# Patient Record
Sex: FEMALE | Race: WHITE | Employment: FULL TIME | URBAN - METROPOLITAN AREA
[De-identification: names, ages, dates, MRNs, and addresses within clinical notes are randomized per-mention and may not be internally consistent; named-entity substitution may affect disease eponyms.]

---

## 2017-03-21 ENCOUNTER — HOSPITAL ENCOUNTER (OUTPATIENT)
Age: 52
Discharge: HOME | End: 2017-03-21
Payer: COMMERCIAL

## 2017-03-21 LAB — HIV AG/AB: NONREACTIVE

## 2017-03-21 PROCEDURE — 86696 HERPES SIMPLEX TYPE 2 TEST: CPT

## 2017-03-21 PROCEDURE — 36415 COLL VENOUS BLD VENIPUNCTURE: CPT

## 2017-03-21 PROCEDURE — 87389 HIV-1 AG W/HIV-1&-2 AB AG IA: CPT

## 2017-03-23 LAB — HERPES SIMPLEX VIRUS 2 IGG: 5.31

## 2017-03-28 ENCOUNTER — HOSPITAL ENCOUNTER (OUTPATIENT)
Dept: WOMENS IMAGING | Age: 52
Discharge: HOME | End: 2017-03-28
Payer: COMMERCIAL

## 2017-03-28 DIAGNOSIS — Z12.39 BREAST CANCER SCREENING: ICD-10-CM

## 2017-03-28 PROCEDURE — 77063 BREAST TOMOSYNTHESIS BI: CPT

## 2017-07-03 ENCOUNTER — ANESTHESIA (OUTPATIENT)
Dept: OPERATING ROOM | Age: 52
End: 2017-07-03
Payer: COMMERCIAL

## 2017-07-03 ENCOUNTER — ANESTHESIA EVENT (OUTPATIENT)
Dept: OPERATING ROOM | Age: 52
End: 2017-07-03
Payer: COMMERCIAL

## 2017-07-03 ENCOUNTER — HOSPITAL ENCOUNTER (OUTPATIENT)
Age: 52
Setting detail: OUTPATIENT SURGERY
Discharge: HOME | End: 2017-07-03
Attending: SURGERY | Admitting: SURGERY
Payer: COMMERCIAL

## 2017-07-03 VITALS — OXYGEN SATURATION: 96 % | DIASTOLIC BLOOD PRESSURE: 44 MMHG | SYSTOLIC BLOOD PRESSURE: 95 MMHG

## 2017-07-03 VITALS
HEIGHT: 62 IN | TEMPERATURE: 98.3 F | DIASTOLIC BLOOD PRESSURE: 63 MMHG | OXYGEN SATURATION: 98 % | RESPIRATION RATE: 16 BRPM | HEART RATE: 62 BPM | WEIGHT: 200 LBS | SYSTOLIC BLOOD PRESSURE: 102 MMHG | BODY MASS INDEX: 36.8 KG/M2

## 2017-07-03 LAB
-: NORMAL
HCG, PREGNANCY URINE (POC): NEGATIVE

## 2017-07-03 PROCEDURE — 84703 CHORIONIC GONADOTROPIN ASSAY: CPT

## 2017-07-03 PROCEDURE — 2500000003 HC RX 250 WO HCPCS: Performed by: NURSE ANESTHETIST, CERTIFIED REGISTERED

## 2017-07-03 PROCEDURE — 7100000010 HC PHASE II RECOVERY - FIRST 15 MIN: Performed by: SURGERY

## 2017-07-03 PROCEDURE — 7100000031 HC ASPR PHASE II RECOVERY - ADDTL 15 MIN: Performed by: SURGERY

## 2017-07-03 PROCEDURE — 7100000000 HC PACU RECOVERY - FIRST 15 MIN: Performed by: SURGERY

## 2017-07-03 PROCEDURE — 3700000001 HC ADD 15 MINUTES (ANESTHESIA): Performed by: SURGERY

## 2017-07-03 PROCEDURE — 7100000001 HC PACU RECOVERY - ADDTL 15 MIN: Performed by: SURGERY

## 2017-07-03 PROCEDURE — 3700000000 HC ANESTHESIA ATTENDED CARE: Performed by: SURGERY

## 2017-07-03 PROCEDURE — 7100000030 HC ASPR PHASE II RECOVERY - FIRST 15 MIN: Performed by: SURGERY

## 2017-07-03 PROCEDURE — 3609027000 HC COLONOSCOPY: Performed by: SURGERY

## 2017-07-03 PROCEDURE — 6360000002 HC RX W HCPCS: Performed by: NURSE ANESTHETIST, CERTIFIED REGISTERED

## 2017-07-03 PROCEDURE — 2580000003 HC RX 258: Performed by: ANESTHESIOLOGY

## 2017-07-03 PROCEDURE — 7100000011 HC PHASE II RECOVERY - ADDTL 15 MIN: Performed by: SURGERY

## 2017-07-03 RX ORDER — PROPOFOL 10 MG/ML
INJECTION, EMULSION INTRAVENOUS PRN
Status: DISCONTINUED | OUTPATIENT
Start: 2017-07-03 | End: 2017-07-03 | Stop reason: SDUPTHER

## 2017-07-03 RX ORDER — LIDOCAINE HYDROCHLORIDE 20 MG/ML
INJECTION, SOLUTION EPIDURAL; INFILTRATION; INTRACAUDAL; PERINEURAL PRN
Status: DISCONTINUED | OUTPATIENT
Start: 2017-07-03 | End: 2017-07-03 | Stop reason: SDUPTHER

## 2017-07-03 RX ORDER — DIPHENHYDRAMINE HYDROCHLORIDE 50 MG/ML
12.5 INJECTION INTRAMUSCULAR; INTRAVENOUS
Status: DISCONTINUED | OUTPATIENT
Start: 2017-07-03 | End: 2017-07-03 | Stop reason: HOSPADM

## 2017-07-03 RX ORDER — SODIUM CHLORIDE, SODIUM LACTATE, POTASSIUM CHLORIDE, CALCIUM CHLORIDE 600; 310; 30; 20 MG/100ML; MG/100ML; MG/100ML; MG/100ML
INJECTION, SOLUTION INTRAVENOUS CONTINUOUS
Status: DISCONTINUED | OUTPATIENT
Start: 2017-07-03 | End: 2017-07-03 | Stop reason: HOSPADM

## 2017-07-03 RX ORDER — MIDAZOLAM HYDROCHLORIDE 1 MG/ML
INJECTION INTRAMUSCULAR; INTRAVENOUS PRN
Status: DISCONTINUED | OUTPATIENT
Start: 2017-07-03 | End: 2017-07-03 | Stop reason: SDUPTHER

## 2017-07-03 RX ORDER — ONDANSETRON 2 MG/ML
4 INJECTION INTRAMUSCULAR; INTRAVENOUS
Status: DISCONTINUED | OUTPATIENT
Start: 2017-07-03 | End: 2017-07-03 | Stop reason: HOSPADM

## 2017-07-03 RX ORDER — FENTANYL CITRATE 50 UG/ML
INJECTION, SOLUTION INTRAMUSCULAR; INTRAVENOUS PRN
Status: DISCONTINUED | OUTPATIENT
Start: 2017-07-03 | End: 2017-07-03 | Stop reason: SDUPTHER

## 2017-07-03 RX ADMIN — FENTANYL CITRATE 25 MCG: 50 INJECTION INTRAMUSCULAR; INTRAVENOUS at 08:04

## 2017-07-03 RX ADMIN — FENTANYL CITRATE 25 MCG: 50 INJECTION INTRAMUSCULAR; INTRAVENOUS at 08:06

## 2017-07-03 RX ADMIN — FENTANYL CITRATE 25 MCG: 50 INJECTION INTRAMUSCULAR; INTRAVENOUS at 08:14

## 2017-07-03 RX ADMIN — SODIUM CHLORIDE, POTASSIUM CHLORIDE, SODIUM LACTATE AND CALCIUM CHLORIDE: 600; 310; 30; 20 INJECTION, SOLUTION INTRAVENOUS at 07:59

## 2017-07-03 RX ADMIN — PROPOFOL 20 MG: 10 INJECTION, EMULSION INTRAVENOUS at 08:15

## 2017-07-03 RX ADMIN — PROPOFOL 20 MG: 10 INJECTION, EMULSION INTRAVENOUS at 08:25

## 2017-07-03 RX ADMIN — PROPOFOL 20 MG: 10 INJECTION, EMULSION INTRAVENOUS at 08:21

## 2017-07-03 RX ADMIN — PROPOFOL 20 MG: 10 INJECTION, EMULSION INTRAVENOUS at 08:04

## 2017-07-03 RX ADMIN — SODIUM CHLORIDE, POTASSIUM CHLORIDE, SODIUM LACTATE AND CALCIUM CHLORIDE: 600; 310; 30; 20 INJECTION, SOLUTION INTRAVENOUS at 07:11

## 2017-07-03 RX ADMIN — PROPOFOL 30 MG: 10 INJECTION, EMULSION INTRAVENOUS at 08:06

## 2017-07-03 RX ADMIN — PROPOFOL 20 MG: 10 INJECTION, EMULSION INTRAVENOUS at 08:09

## 2017-07-03 RX ADMIN — LIDOCAINE HYDROCHLORIDE 100 MG: 20 INJECTION, SOLUTION EPIDURAL; INFILTRATION; INTRACAUDAL; PERINEURAL at 08:04

## 2017-07-03 RX ADMIN — PROPOFOL 20 MG: 10 INJECTION, EMULSION INTRAVENOUS at 08:13

## 2017-07-03 RX ADMIN — PROPOFOL 20 MG: 10 INJECTION, EMULSION INTRAVENOUS at 08:17

## 2017-07-03 RX ADMIN — FENTANYL CITRATE 25 MCG: 50 INJECTION INTRAMUSCULAR; INTRAVENOUS at 08:11

## 2017-07-03 RX ADMIN — MIDAZOLAM 2 MG: 1 INJECTION INTRAMUSCULAR; INTRAVENOUS at 07:59

## 2017-07-03 ASSESSMENT — PAIN - FUNCTIONAL ASSESSMENT: PAIN_FUNCTIONAL_ASSESSMENT: 0-10

## 2017-07-03 ASSESSMENT — PAIN SCALES - GENERAL: PAINLEVEL_OUTOF10: 0

## 2017-12-11 ENCOUNTER — HOSPITAL ENCOUNTER (INPATIENT)
Age: 52
LOS: 2 days | Discharge: PSYCHIATRIC OR SUBSTANCE USE FACILITY | DRG: 881 | End: 2017-12-13
Attending: EMERGENCY MEDICINE | Admitting: INTERNAL MEDICINE
Payer: COMMERCIAL

## 2017-12-11 DIAGNOSIS — F32.A DEPRESSION, UNSPECIFIED DEPRESSION TYPE: ICD-10-CM

## 2017-12-11 DIAGNOSIS — R45.851 SUICIDAL IDEATION: ICD-10-CM

## 2017-12-11 DIAGNOSIS — F10.920 ACUTE ALCOHOLIC INTOXICATION WITHOUT COMPLICATION (HCC): Primary | ICD-10-CM

## 2017-12-11 PROBLEM — F10.10 ALCOHOL ABUSE: Status: ACTIVE | Noted: 2017-12-11

## 2017-12-11 LAB
ABSOLUTE EOS #: 0.1 K/UL (ref 0–0.4)
ABSOLUTE IMMATURE GRANULOCYTE: ABNORMAL K/UL (ref 0–0.3)
ABSOLUTE LYMPH #: 1.9 K/UL (ref 1–4.8)
ABSOLUTE MONO #: 0.5 K/UL (ref 0.1–1.3)
ANION GAP SERPL CALCULATED.3IONS-SCNC: 12 MMOL/L (ref 9–17)
BASOPHILS # BLD: 1 % (ref 0–2)
BASOPHILS ABSOLUTE: 0.1 K/UL (ref 0–0.2)
BUN BLDV-MCNC: 14 MG/DL (ref 6–20)
BUN/CREAT BLD: ABNORMAL (ref 9–20)
CALCIUM SERPL-MCNC: 8.3 MG/DL (ref 8.6–10.4)
CHLORIDE BLD-SCNC: 103 MMOL/L (ref 98–107)
CO2: 27 MMOL/L (ref 20–31)
CREAT SERPL-MCNC: 0.57 MG/DL (ref 0.5–0.9)
DIFFERENTIAL TYPE: ABNORMAL
EOSINOPHILS RELATIVE PERCENT: 2 % (ref 0–4)
ETHANOL PERCENT: 0.43 %
ETHANOL: 430 MG/DL
GFR AFRICAN AMERICAN: >60 ML/MIN
GFR NON-AFRICAN AMERICAN: >60 ML/MIN
GFR SERPL CREATININE-BSD FRML MDRD: ABNORMAL ML/MIN/{1.73_M2}
GFR SERPL CREATININE-BSD FRML MDRD: ABNORMAL ML/MIN/{1.73_M2}
GLUCOSE BLD-MCNC: 92 MG/DL (ref 70–99)
HCT VFR BLD CALC: 44.1 % (ref 36–46)
HEMOGLOBIN: 14.9 G/DL (ref 12–16)
IMMATURE GRANULOCYTES: ABNORMAL %
LYMPHOCYTES # BLD: 28 % (ref 24–44)
MAGNESIUM: 2 MG/DL (ref 1.6–2.6)
MCH RBC QN AUTO: 31.4 PG (ref 26–34)
MCHC RBC AUTO-ENTMCNC: 33.9 G/DL (ref 31–37)
MCV RBC AUTO: 92.7 FL (ref 80–100)
MONOCYTES # BLD: 8 % (ref 1–7)
PDW BLD-RTO: 16.7 % (ref 11.5–14.9)
PLATELET # BLD: 125 K/UL (ref 150–450)
PLATELET ESTIMATE: ABNORMAL
PMV BLD AUTO: 7.9 FL (ref 6–12)
POTASSIUM SERPL-SCNC: 3.9 MMOL/L (ref 3.7–5.3)
RBC # BLD: 4.75 M/UL (ref 4–5.2)
RBC # BLD: ABNORMAL 10*6/UL
SEG NEUTROPHILS: 61 % (ref 36–66)
SEGMENTED NEUTROPHILS ABSOLUTE COUNT: 4.2 K/UL (ref 1.3–9.1)
SODIUM BLD-SCNC: 142 MMOL/L (ref 135–144)
WBC # BLD: 6.8 K/UL (ref 3.5–11)
WBC # BLD: ABNORMAL 10*3/UL

## 2017-12-11 PROCEDURE — 2060000000 HC ICU INTERMEDIATE R&B

## 2017-12-11 PROCEDURE — 80048 BASIC METABOLIC PNL TOTAL CA: CPT

## 2017-12-11 PROCEDURE — 94760 N-INVAS EAR/PLS OXIMETRY 1: CPT

## 2017-12-11 PROCEDURE — 99285 EMERGENCY DEPT VISIT HI MDM: CPT

## 2017-12-11 PROCEDURE — 85025 COMPLETE CBC W/AUTO DIFF WBC: CPT

## 2017-12-11 PROCEDURE — 2580000003 HC RX 258: Performed by: STUDENT IN AN ORGANIZED HEALTH CARE EDUCATION/TRAINING PROGRAM

## 2017-12-11 PROCEDURE — G0480 DRUG TEST DEF 1-7 CLASSES: HCPCS

## 2017-12-11 PROCEDURE — 36415 COLL VENOUS BLD VENIPUNCTURE: CPT

## 2017-12-11 PROCEDURE — 6370000000 HC RX 637 (ALT 250 FOR IP): Performed by: STUDENT IN AN ORGANIZED HEALTH CARE EDUCATION/TRAINING PROGRAM

## 2017-12-11 PROCEDURE — 99223 1ST HOSP IP/OBS HIGH 75: CPT | Performed by: INTERNAL MEDICINE

## 2017-12-11 PROCEDURE — 2500000003 HC RX 250 WO HCPCS: Performed by: STUDENT IN AN ORGANIZED HEALTH CARE EDUCATION/TRAINING PROGRAM

## 2017-12-11 PROCEDURE — 6360000002 HC RX W HCPCS: Performed by: STUDENT IN AN ORGANIZED HEALTH CARE EDUCATION/TRAINING PROGRAM

## 2017-12-11 PROCEDURE — 83735 ASSAY OF MAGNESIUM: CPT

## 2017-12-11 RX ORDER — TRAZODONE HYDROCHLORIDE 100 MG/1
100 TABLET ORAL NIGHTLY PRN
Status: DISCONTINUED | OUTPATIENT
Start: 2017-12-11 | End: 2017-12-13 | Stop reason: HOSPADM

## 2017-12-11 RX ORDER — LORAZEPAM 1 MG/1
1 TABLET ORAL
Status: DISCONTINUED | OUTPATIENT
Start: 2017-12-11 | End: 2017-12-13 | Stop reason: HOSPADM

## 2017-12-11 RX ORDER — LORAZEPAM 2 MG/ML
1 INJECTION INTRAMUSCULAR
Status: DISCONTINUED | OUTPATIENT
Start: 2017-12-11 | End: 2017-12-13 | Stop reason: HOSPADM

## 2017-12-11 RX ORDER — LORAZEPAM 2 MG/ML
4 INJECTION INTRAMUSCULAR
Status: DISCONTINUED | OUTPATIENT
Start: 2017-12-11 | End: 2017-12-13 | Stop reason: HOSPADM

## 2017-12-11 RX ORDER — LORAZEPAM 2 MG/ML
2 INJECTION INTRAMUSCULAR
Status: DISCONTINUED | OUTPATIENT
Start: 2017-12-11 | End: 2017-12-13 | Stop reason: HOSPADM

## 2017-12-11 RX ORDER — SODIUM CHLORIDE 0.9 % (FLUSH) 0.9 %
10 SYRINGE (ML) INJECTION PRN
Status: DISCONTINUED | OUTPATIENT
Start: 2017-12-11 | End: 2017-12-13 | Stop reason: HOSPADM

## 2017-12-11 RX ORDER — LORAZEPAM 2 MG/ML
3 INJECTION INTRAMUSCULAR
Status: DISCONTINUED | OUTPATIENT
Start: 2017-12-11 | End: 2017-12-13 | Stop reason: HOSPADM

## 2017-12-11 RX ORDER — NICOTINE 21 MG/24HR
1 PATCH, TRANSDERMAL 24 HOURS TRANSDERMAL DAILY
Status: DISCONTINUED | OUTPATIENT
Start: 2017-12-11 | End: 2017-12-13 | Stop reason: HOSPADM

## 2017-12-11 RX ORDER — SODIUM CHLORIDE 0.9 % (FLUSH) 0.9 %
10 SYRINGE (ML) INJECTION EVERY 12 HOURS SCHEDULED
Status: DISCONTINUED | OUTPATIENT
Start: 2017-12-11 | End: 2017-12-13 | Stop reason: HOSPADM

## 2017-12-11 RX ORDER — LORAZEPAM 1 MG/1
4 TABLET ORAL
Status: DISCONTINUED | OUTPATIENT
Start: 2017-12-11 | End: 2017-12-13 | Stop reason: HOSPADM

## 2017-12-11 RX ORDER — LORAZEPAM 1 MG/1
2 TABLET ORAL
Status: DISCONTINUED | OUTPATIENT
Start: 2017-12-11 | End: 2017-12-13 | Stop reason: HOSPADM

## 2017-12-11 RX ORDER — VALACYCLOVIR HYDROCHLORIDE 500 MG/1
500 TABLET, FILM COATED ORAL 2 TIMES DAILY
Status: DISCONTINUED | OUTPATIENT
Start: 2017-12-11 | End: 2017-12-13 | Stop reason: HOSPADM

## 2017-12-11 RX ORDER — SODIUM CHLORIDE 9 MG/ML
INJECTION, SOLUTION INTRAVENOUS CONTINUOUS
Status: DISCONTINUED | OUTPATIENT
Start: 2017-12-11 | End: 2017-12-13 | Stop reason: HOSPADM

## 2017-12-11 RX ORDER — LORAZEPAM 1 MG/1
3 TABLET ORAL
Status: DISCONTINUED | OUTPATIENT
Start: 2017-12-11 | End: 2017-12-13 | Stop reason: HOSPADM

## 2017-12-11 RX ORDER — ONDANSETRON 2 MG/ML
4 INJECTION INTRAMUSCULAR; INTRAVENOUS EVERY 6 HOURS PRN
Status: DISCONTINUED | OUTPATIENT
Start: 2017-12-11 | End: 2017-12-13 | Stop reason: HOSPADM

## 2017-12-11 RX ORDER — BUPROPION HYDROCHLORIDE 150 MG/1
150 TABLET ORAL DAILY
Status: DISCONTINUED | OUTPATIENT
Start: 2017-12-11 | End: 2017-12-13 | Stop reason: HOSPADM

## 2017-12-11 RX ORDER — FAMOTIDINE 20 MG/1
20 TABLET, FILM COATED ORAL 2 TIMES DAILY
Status: DISCONTINUED | OUTPATIENT
Start: 2017-12-11 | End: 2017-12-13 | Stop reason: HOSPADM

## 2017-12-11 RX ADMIN — SODIUM CHLORIDE: 9 INJECTION, SOLUTION INTRAVENOUS at 16:13

## 2017-12-11 RX ADMIN — SERTRALINE HYDROCHLORIDE 50 MG: 50 TABLET ORAL at 16:26

## 2017-12-11 RX ADMIN — LORAZEPAM 2 MG: 1 TABLET ORAL at 18:49

## 2017-12-11 RX ADMIN — FOLIC ACID: 5 INJECTION, SOLUTION INTRAMUSCULAR; INTRAVENOUS; SUBCUTANEOUS at 16:13

## 2017-12-11 RX ADMIN — FAMOTIDINE 20 MG: 20 TABLET, FILM COATED ORAL at 16:26

## 2017-12-11 RX ADMIN — FAMOTIDINE 20 MG: 20 TABLET, FILM COATED ORAL at 21:58

## 2017-12-11 RX ADMIN — BUPROPION HYDROCHLORIDE 150 MG: 150 TABLET, FILM COATED, EXTENDED RELEASE ORAL at 18:44

## 2017-12-11 RX ADMIN — ENOXAPARIN SODIUM 40 MG: 40 INJECTION SUBCUTANEOUS at 16:26

## 2017-12-11 ASSESSMENT — ENCOUNTER SYMPTOMS
COUGH: 0
EYE DISCHARGE: 0
SORE THROAT: 0
DIARRHEA: 0
COLOR CHANGE: 0
BLURRED VISION: 0
RHINORRHEA: 0
VOMITING: 0
ABDOMINAL PAIN: 0
NAUSEA: 0
SHORTNESS OF BREATH: 0
EYE REDNESS: 0

## 2017-12-11 ASSESSMENT — PAIN SCALES - GENERAL: PAINLEVEL_OUTOF10: 0

## 2017-12-11 NOTE — ED NOTES
Pt presents to the [facility]. Pt appears intoxicated, pt unsteady on feet, crying and whining. Pt reports that she had a pint of vodka this morning. Charge  RN notified.

## 2017-12-11 NOTE — H&P
Provider, MD   BuPROPion HCl (WELLBUTRIN PO) Take 1 tablet by mouth daily    Historical Provider, MD   MELOXICAM PO Take 1 tablet by mouth daily    Historical Provider, MD        Allergies:     Review of patient's allergies indicates no known allergies. Social History:     Tobacco:    reports that she has quit smoking. She does not have any smokeless tobacco history on file. Alcohol:      reports that she does not drink alcohol. Drug Use:  reports that she does not use drugs. Family History:     No family history on file. Review of Systems:     Positive and Negative as described in HPI. Review of Systems   Constitutional: Negative for chills, fever and malaise/fatigue. Eyes: Negative for blurred vision. Respiratory: Negative for cough and shortness of breath. Cardiovascular: Negative for chest pain and palpitations. Gastrointestinal: Negative for abdominal pain, nausea and vomiting. Genitourinary: Negative for dysuria. Musculoskeletal: Negative for myalgias. Neurological: Negative for dizziness, weakness and headaches. Psychiatric/Behavioral: Positive for depression and suicidal ideas. Negative for hallucinations and substance abuse. The patient is nervous/anxious. Physical Exam:   /76   Pulse 77   Temp 98.6 °F (37 °C) (Oral)   Resp 12   Ht 5' 2\" (1.575 m)   Wt 190 lb (86.2 kg)   SpO2 93%   BMI 34.75 kg/m²   Temp (24hrs), Av.6 °F (37 °C), Min:98.6 °F (37 °C), Max:98.6 °F (37 °C)    No results for input(s): POCGLU in the last 72 hours. No intake or output data in the 24 hours ending 17 1341    Physical Exam   Constitutional: She is oriented to person, place, and time and well-developed, well-nourished, and in no distress. Cardiovascular: Normal rate and regular rhythm. Pulmonary/Chest: Effort normal and breath sounds normal.   Abdominal: Soft. Bowel sounds are normal.   Musculoskeletal: She exhibits no edema or tenderness.    Neurological: She is

## 2017-12-11 NOTE — PLAN OF CARE
Problem: Falls - Risk of  Goal: Absence of falls  Outcome: Ongoing  No falls noted this shift. Patient ambulates with x1 staff assistance without difficulty. Guard at the bedside. Problem: Suicide risk  Goal: Provide patient with safe environment  Provide patient with safe environment  Outcome: Ongoing  She admits to having thoughts of harming herself. No plan and no intention to harm anyone else. Guard remains at the bedside.

## 2017-12-11 NOTE — ED PROVIDER NOTES
[address] [facility]  eMERGENCY dEPARTMENT eNCOUnter      Pt Name: Lefty Vivas  MRN: [id number]  [place] 1965  Date of evaluation: 12/11/2017    CHIEF COMPLAINT       Chief Complaint   Patient presents with   [address] Problem    Depression         HISTORY OF PRESENT ILLNESS    Lefty Vivas is a 47 y.o. female who presents With complaints of alcohol intoxication and suicidal ideation. Patient states she is a daily drinker with a history of delirium tremens and alcohol withdrawal seizures. She states that she started around 9:00 this morning and drank about 1 pint of alcohol. She states that she is feeling suicidal and wants to kill himself. REVIEW OF SYSTEMS         Review of Systems   Constitutional: Negative for chills and fever. HENT: Negative for rhinorrhea and sore throat. Eyes: Negative for discharge, redness and visual disturbance. Respiratory: Negative for cough and shortness of breath. Cardiovascular: Negative for chest pain, palpitations and leg swelling. Gastrointestinal: Negative for diarrhea, nausea and vomiting. Genitourinary: Negative for dysuria and hematuria. Musculoskeletal: Negative for arthralgias, myalgias and neck pain. Skin: Negative for color change and rash. Neurological: Negative for seizures, weakness and headaches. Psychiatric/Behavioral: Positive for suicidal ideas. Negative for hallucinations and self-injury. Alcohol intoxication            PAST MEDICAL HISTORY    has a past medical history of Depression; Kidney disease; and Liver failure, acute. SURGICAL HISTORY      has a past surgical history that includes Hand surgery (Right); Dilation and curettage of uterus; Colonoscopy (07/03/2017); and colon ca scrn not hi rsk ind (N/A, 7/3/2017).      CURRENT MEDICATIONS       Previous Medications    BUPROPION HCL (WELLBUTRIN PO)    Take 1 tablet by mouth daily    MELOXICAM PO    Take 1 tablet by mouth daily SERTRALINE (ZOLOFT) 50 MG TABLET    Take 50 mg by mouth daily       ALLERGIES     has No Known Allergies. FAMILY HISTORY     has no family status information on file. family history is not on file. SOCIAL HISTORY      reports that she has quit smoking. She does not have any smokeless tobacco history on file. She reports that she does not drink alcohol or use drugs. PHYSICAL EXAM     INITIAL VITALS:  height is 5' 2\" (1.575 m) and weight is 190 lb (86.2 kg). Her oral temperature is 98.6 °F (37 °C). Her blood pressure is 145/91 (abnormal) and her pulse is 101. Her respiration is 18 and oxygen saturation is 95%. Physical Exam   Constitutional: She is oriented to person, place, and time. She appears well-developed and well-nourished. Non-toxic appearance. She does not appear ill. HENT:   Head: Normocephalic and atraumatic. Eyes: Conjunctivae and EOM are normal. Pupils are equal, round, and reactive to light. Neck: Trachea normal and normal range of motion. Neck supple. Cardiovascular: Normal rate, regular rhythm, S1 normal and S2 normal.    No murmur heard. Pulmonary/Chest: Effort normal and breath sounds normal. No accessory muscle usage. No respiratory distress. She exhibits no tenderness and no deformity. Abdominal: Normal appearance and bowel sounds are normal. She exhibits no distension, no abdominal bruit and no ascites. There is no tenderness. There is no rigidity, no rebound and no guarding. Neurological: She is oriented to person, place, and time. GCS eye subscore is 4. GCS verbal subscore is 5. GCS motor subscore is 6. Intoxicated   Skin: Skin is warm. No rash noted. Psychiatric: Her speech is normal. She exhibits a depressed mood. She expresses suicidal ideation. DIFFERENTIAL DIAGNOSIS/MDM:   60-year-old female presents with alcohol intoxication with suicidal ideation.   Plan is Blood alcohol level, monitor for signs of alcohol withdrawal.    DIAGNOSTIC RESULTS

## 2017-12-12 LAB
ABSOLUTE EOS #: 0.1 K/UL (ref 0–0.4)
ABSOLUTE IMMATURE GRANULOCYTE: ABNORMAL K/UL (ref 0–0.3)
ABSOLUTE LYMPH #: 1.7 K/UL (ref 1–4.8)
ABSOLUTE MONO #: 0.3 K/UL (ref 0.1–1.3)
ALBUMIN SERPL-MCNC: 3 G/DL (ref 3.5–5.2)
ALBUMIN/GLOBULIN RATIO: ABNORMAL (ref 1–2.5)
ALP BLD-CCNC: 66 U/L (ref 35–104)
ALT SERPL-CCNC: 21 U/L (ref 5–33)
ANION GAP SERPL CALCULATED.3IONS-SCNC: 12 MMOL/L (ref 9–17)
AST SERPL-CCNC: 53 U/L
BASOPHILS # BLD: 1 % (ref 0–2)
BASOPHILS ABSOLUTE: 0.1 K/UL (ref 0–0.2)
BILIRUB SERPL-MCNC: 0.24 MG/DL (ref 0.3–1.2)
BILIRUBIN DIRECT: 0.11 MG/DL
BILIRUBIN, INDIRECT: 0.13 MG/DL (ref 0–1)
BUN BLDV-MCNC: 13 MG/DL (ref 6–20)
BUN/CREAT BLD: ABNORMAL (ref 9–20)
CALCIUM SERPL-MCNC: 7 MG/DL (ref 8.6–10.4)
CHLORIDE BLD-SCNC: 104 MMOL/L (ref 98–107)
CO2: 23 MMOL/L (ref 20–31)
CREAT SERPL-MCNC: 0.54 MG/DL (ref 0.5–0.9)
DIFFERENTIAL TYPE: ABNORMAL
EOSINOPHILS RELATIVE PERCENT: 2 % (ref 0–4)
GFR AFRICAN AMERICAN: >60 ML/MIN
GFR NON-AFRICAN AMERICAN: >60 ML/MIN
GFR SERPL CREATININE-BSD FRML MDRD: ABNORMAL ML/MIN/{1.73_M2}
GFR SERPL CREATININE-BSD FRML MDRD: ABNORMAL ML/MIN/{1.73_M2}
GLOBULIN: ABNORMAL G/DL (ref 1.5–3.8)
GLUCOSE BLD-MCNC: 77 MG/DL (ref 70–99)
HCT VFR BLD CALC: 37.2 % (ref 36–46)
HEMOGLOBIN: 12.4 G/DL (ref 12–16)
IMMATURE GRANULOCYTES: ABNORMAL %
LYMPHOCYTES # BLD: 35 % (ref 24–44)
MCH RBC QN AUTO: 31.1 PG (ref 26–34)
MCHC RBC AUTO-ENTMCNC: 33.4 G/DL (ref 31–37)
MCV RBC AUTO: 93.1 FL (ref 80–100)
MONOCYTES # BLD: 6 % (ref 1–7)
PDW BLD-RTO: 16.9 % (ref 11.5–14.9)
PLATELET # BLD: 92 K/UL (ref 150–450)
PLATELET ESTIMATE: ABNORMAL
PMV BLD AUTO: 8 FL (ref 6–12)
POTASSIUM SERPL-SCNC: 3.9 MMOL/L (ref 3.7–5.3)
RBC # BLD: 3.99 M/UL (ref 4–5.2)
RBC # BLD: ABNORMAL 10*6/UL
SEG NEUTROPHILS: 56 % (ref 36–66)
SEGMENTED NEUTROPHILS ABSOLUTE COUNT: 2.8 K/UL (ref 1.3–9.1)
SODIUM BLD-SCNC: 139 MMOL/L (ref 135–144)
TOTAL PROTEIN: 5.6 G/DL (ref 6.4–8.3)
WBC # BLD: 5 K/UL (ref 3.5–11)
WBC # BLD: ABNORMAL 10*3/UL

## 2017-12-12 PROCEDURE — 97161 PT EVAL LOW COMPLEX 20 MIN: CPT

## 2017-12-12 PROCEDURE — G8979 MOBILITY GOAL STATUS: HCPCS

## 2017-12-12 PROCEDURE — 6370000000 HC RX 637 (ALT 250 FOR IP): Performed by: STUDENT IN AN ORGANIZED HEALTH CARE EDUCATION/TRAINING PROGRAM

## 2017-12-12 PROCEDURE — 80076 HEPATIC FUNCTION PANEL: CPT

## 2017-12-12 PROCEDURE — 36415 COLL VENOUS BLD VENIPUNCTURE: CPT

## 2017-12-12 PROCEDURE — 80048 BASIC METABOLIC PNL TOTAL CA: CPT

## 2017-12-12 PROCEDURE — 99232 SBSQ HOSP IP/OBS MODERATE 35: CPT | Performed by: INTERNAL MEDICINE

## 2017-12-12 PROCEDURE — 2580000003 HC RX 258: Performed by: STUDENT IN AN ORGANIZED HEALTH CARE EDUCATION/TRAINING PROGRAM

## 2017-12-12 PROCEDURE — 2060000000 HC ICU INTERMEDIATE R&B

## 2017-12-12 PROCEDURE — 85025 COMPLETE CBC W/AUTO DIFF WBC: CPT

## 2017-12-12 PROCEDURE — G8978 MOBILITY CURRENT STATUS: HCPCS

## 2017-12-12 PROCEDURE — 6360000002 HC RX W HCPCS: Performed by: STUDENT IN AN ORGANIZED HEALTH CARE EDUCATION/TRAINING PROGRAM

## 2017-12-12 PROCEDURE — 97165 OT EVAL LOW COMPLEX 30 MIN: CPT

## 2017-12-12 RX ORDER — LORAZEPAM 1 MG/1
2 TABLET ORAL
Status: CANCELLED | OUTPATIENT
Start: 2017-12-12

## 2017-12-12 RX ORDER — LORAZEPAM 1 MG/1
1 TABLET ORAL
Status: CANCELLED | OUTPATIENT
Start: 2017-12-12

## 2017-12-12 RX ORDER — LORAZEPAM 2 MG/ML
3 INJECTION INTRAMUSCULAR
Status: CANCELLED | OUTPATIENT
Start: 2017-12-12

## 2017-12-12 RX ORDER — LORAZEPAM 1 MG/1
4 TABLET ORAL
Status: CANCELLED | OUTPATIENT
Start: 2017-12-12

## 2017-12-12 RX ORDER — SODIUM CHLORIDE 0.9 % (FLUSH) 0.9 %
10 SYRINGE (ML) INJECTION PRN
Status: CANCELLED | OUTPATIENT
Start: 2017-12-12

## 2017-12-12 RX ORDER — SODIUM CHLORIDE 9 MG/ML
INJECTION, SOLUTION INTRAVENOUS CONTINUOUS
Status: CANCELLED | OUTPATIENT
Start: 2017-12-12

## 2017-12-12 RX ORDER — SODIUM CHLORIDE 0.9 % (FLUSH) 0.9 %
10 SYRINGE (ML) INJECTION EVERY 12 HOURS SCHEDULED
Status: CANCELLED | OUTPATIENT
Start: 2017-12-12

## 2017-12-12 RX ORDER — FAMOTIDINE 20 MG/1
20 TABLET, FILM COATED ORAL 2 TIMES DAILY
Status: CANCELLED | OUTPATIENT
Start: 2017-12-12

## 2017-12-12 RX ORDER — ONDANSETRON 2 MG/ML
4 INJECTION INTRAMUSCULAR; INTRAVENOUS EVERY 6 HOURS PRN
Status: CANCELLED | OUTPATIENT
Start: 2017-12-12

## 2017-12-12 RX ORDER — TRAZODONE HYDROCHLORIDE 100 MG/1
100 TABLET ORAL NIGHTLY PRN
Status: CANCELLED | OUTPATIENT
Start: 2017-12-12

## 2017-12-12 RX ORDER — CHLORDIAZEPOXIDE HYDROCHLORIDE 5 MG/1
5 CAPSULE, GELATIN COATED ORAL 4 TIMES DAILY
Status: CANCELLED | OUTPATIENT
Start: 2017-12-12

## 2017-12-12 RX ORDER — BUPROPION HYDROCHLORIDE 150 MG/1
150 TABLET ORAL DAILY
Status: CANCELLED | OUTPATIENT
Start: 2017-12-13

## 2017-12-12 RX ORDER — LORAZEPAM 2 MG/ML
2 INJECTION INTRAMUSCULAR
Status: CANCELLED | OUTPATIENT
Start: 2017-12-12

## 2017-12-12 RX ORDER — CHLORDIAZEPOXIDE HYDROCHLORIDE 25 MG/1
50 CAPSULE, GELATIN COATED ORAL 2 TIMES DAILY
Status: DISCONTINUED | OUTPATIENT
Start: 2017-12-12 | End: 2017-12-13 | Stop reason: HOSPADM

## 2017-12-12 RX ORDER — LORAZEPAM 1 MG/1
3 TABLET ORAL
Status: CANCELLED | OUTPATIENT
Start: 2017-12-12

## 2017-12-12 RX ORDER — LORAZEPAM 2 MG/ML
4 INJECTION INTRAMUSCULAR
Status: CANCELLED | OUTPATIENT
Start: 2017-12-12

## 2017-12-12 RX ORDER — ACYCLOVIR 200 MG/1
400 CAPSULE ORAL 3 TIMES DAILY
Status: CANCELLED | OUTPATIENT
Start: 2017-12-12

## 2017-12-12 RX ORDER — LORAZEPAM 2 MG/ML
1 INJECTION INTRAMUSCULAR
Status: CANCELLED | OUTPATIENT
Start: 2017-12-12

## 2017-12-12 RX ORDER — NICOTINE 21 MG/24HR
1 PATCH, TRANSDERMAL 24 HOURS TRANSDERMAL DAILY
Status: CANCELLED | OUTPATIENT
Start: 2017-12-13

## 2017-12-12 RX ADMIN — SERTRALINE HYDROCHLORIDE 50 MG: 50 TABLET ORAL at 09:55

## 2017-12-12 RX ADMIN — LORAZEPAM 3 MG: 1 TABLET ORAL at 10:03

## 2017-12-12 RX ADMIN — SODIUM CHLORIDE: 9 INJECTION, SOLUTION INTRAVENOUS at 05:31

## 2017-12-12 RX ADMIN — FAMOTIDINE 20 MG: 20 TABLET, FILM COATED ORAL at 09:55

## 2017-12-12 RX ADMIN — SODIUM CHLORIDE: 9 INJECTION, SOLUTION INTRAVENOUS at 17:51

## 2017-12-12 RX ADMIN — LORAZEPAM 4 MG: 2 INJECTION INTRAMUSCULAR; INTRAVENOUS at 14:04

## 2017-12-12 RX ADMIN — BUPROPION HYDROCHLORIDE 150 MG: 150 TABLET, FILM COATED, EXTENDED RELEASE ORAL at 09:55

## 2017-12-12 RX ADMIN — VALACYCLOVIR 500 MG: 500 TABLET, FILM COATED ORAL at 09:55

## 2017-12-12 RX ADMIN — LORAZEPAM 2 MG: 1 TABLET ORAL at 17:51

## 2017-12-12 RX ADMIN — Medication 10 ML: at 09:55

## 2017-12-12 RX ADMIN — ONDANSETRON 4 MG: 2 INJECTION INTRAMUSCULAR; INTRAVENOUS at 09:58

## 2017-12-12 RX ADMIN — CHLORDIAZEPOXIDE HYDROCHLORIDE 50 MG: 25 CAPSULE ORAL at 20:35

## 2017-12-12 RX ADMIN — FAMOTIDINE 20 MG: 20 TABLET, FILM COATED ORAL at 20:36

## 2017-12-12 RX ADMIN — ENOXAPARIN SODIUM 40 MG: 40 INJECTION SUBCUTANEOUS at 09:55

## 2017-12-12 RX ADMIN — VALACYCLOVIR 500 MG: 500 TABLET, FILM COATED ORAL at 20:35

## 2017-12-12 ASSESSMENT — PAIN SCALES - GENERAL: PAINLEVEL_OUTOF10: 0

## 2017-12-12 NOTE — CARE COORDINATION
CASE MANAGEMENT NOTE:    Admission Date:  12/11/2017 Brianda Ortiz is a 47 y.o.  female    Admitted for : Suicidal ideation [R45.851]    Met with:  Patient    PCP:  Britton Escalante                                 Insurance:  [facility]      Current Residence/ Living Arrangements:  independently at home             Current Services PTA:  No    Is patient agreeable to VNS: No    Freedom of choice provided: NA         VNS chosen:  NA    DME:  none    Home Oxygen: No    Nebulizer: No    Supplier: N/A    Potential Assistance Needed: No    SNF needed: No    Ph[facility]:  [facility] on [address]       Does Patient want to use MEDS to BEDS? No    Family Members/Caregivers that pt would like involved in their care:    Yes    If yes, list name here:  Chalo Calderonoks    Transportation Provider:  Family                      Discharge Plan:  12/12/17 [employer] Pt is from home in a one story apt pt uses no dme and denies need for vns pt will discharge to inpt psych when medically cleared per pt will continue to follow .//tv             Readmission Risk              Readmission Risk:        5.5       Age 72 or Greater:  0    Admitted from SNF or Requires Paid or Family Care:  0    Currently has CHF,COPD,ARF,CRI,or is on dialysis:  0    Takes more than 5 Prescription Medications:  0    Takes Digoxin,Insulin,Anticoagulants,Narcotics or ASA/Plavix:  [address] in Past 12 Months:  0    On Disability:  3    Patient Considers own Health:  2.5          Electronically signed by:  Joe Palafox RN on 12/12/2017 at 2:28 PM

## 2017-12-12 NOTE — PROGRESS NOTES
[address]   Occupational Therapy Evaluation  Date: 17  Patient Name: Suzanne Bedolla       Room:   MRN: [id number]  Account: [de-identified]   : 1965  (47 y.o.) Gender: female     Referring Practitioner: Dr. Tra Martinez  Diagnosis: Suicidal ideation    Treatment Diagnosis: Impaired self-care status. Past Medical History:  has a past medical history of Depression; Kidney disease; and Liver failure, acute. Past Surgical History:   has a past surgical history that includes Hand surgery (Right); Dilation and curettage of uterus; Colonoscopy (2017); and colon ca scrn not hi rsk ind (N/A, 7/3/2017). Restrictions  Restrictions/Precautions: Fall Risk (sitter due to suicdal ideation.)  Required Braces or Orthoses?: No     Vitals  Temp: 97.2 °F (36.2 °C)  Pulse: 78  Resp: 14  BP: (!) 109/53  Height: 5' 2\" (157.5 cm)  Weight: 190 lb (86.2 kg)  BMI (Calculated): 34.8  Oxygen Therapy  SpO2: 96 %  Pulse Oximeter Device Mode: Continuous  O2 Device: None (Room air)  Level of Consciousness: Alert    Subjective  Subjective: \"I haven't been really doing any of that.  I've been depressed\"  Comments: Referring to IADLs     Vision  Vision: Impaired  Vision Exceptions: Wears glasses at all times  Hearing  Hearing: Within functional limits  Social/Functional History  Lives With: Alone  Type of Home: Apartment  Home Layout: One level  Home Access: Stairs to enter with rails  Entrance Stairs - Number of Steps: 15  Entrance Stairs - Rails: Left  Bathroom Shower/Tub: Tub/Shower unit, Curtain  Bathroom Toilet: Standard  Home Equipment:  (No DME)  Receives Help From: Family  ADL Assistance: Independent  Homemaking Assistance: Independent  Homemaking Responsibilities: Yes  Ambulation Assistance: Independent (no device)  Transfer Assistance: Independent  Active : Yes  Mode of Transportation: Car  Occupation: On disability  Additional Comments: Pt reports that mother and aunt are available for A PRN upon d/c. Mother is retired and aunt works a couple days a week. Pt states that she had been doing all her IADLs previously, but due to recent depression was not doing IADLs due to lack of motivation. Objective  Sensation  Overall Sensation Status: Impaired (C/O tingling bilateral feet- happens when I am withdrawing)   ADL  Feeding: Independent  Grooming: Setup, Stand by assistance (SBA while standing at sink)  UE Bathing: Setup  LE Bathing: Contact guard assistance, Setup  UE Dressing: Setup  LE Dressing: Setup, Contact guard assistance  Toileting: Stand by assistance  Additional Comments: Pt donned socks while seated EOB with S/U. Pt engaged in functional mobility with CGA to bathroom. Pt engaged in toilet transfer and toileting tasks with SBA. While seated on toilet, Pt had emesis, stating that moving around made her nauseated. Pt stood at sink with SBA for about 4 minutes with 0-1 UE support while washing her face and brushing her teeth. UE Function  LUE Strength  Gross LUE Strength: Exceptions to [facility] [place]  L Hand Grasp: 3+/5  LUE Strength Comment: Shoulder 3+/5, elbow 4-/5     LUE Tone: Normotonic     LUE AROM (degrees)  LUE AROM : WFL     Left Hand AROM (degrees)  Left Hand AROM: WFL  RUE Strength  Gross RUE Strength: Exceptions to [facility] [place]  R Hand Grasp: 3+/5  RUE Strength Comment: Shoulder 3+/5, elbow 4-/5      RUE Tone: Normotonic     RUE AROM (degrees)  RUE AROM : WFL     Right Hand AROM (degrees)  Right Hand AROM: WFL    Fine Motor Skills  Coordination  Movements Are Fluid And Coordinated: No  Coordination and Movement description: Tremors (BUE.  States this is d/t withdrawal)        Mobility  Supine to Sit: Modified independent  Sit to Supine: Modified independent   Stand Pivot Transfers: Stand by assistance   Balance  Sitting Balance: Supervision  Standing Balance: Contact guard assistance (CGA dynamic; SBA static)  Standing Balance  Time: ~4 minutes  Sit to stand: Contact guard assistance  Stand to sit: Contact guard assistance  Functional Mobility  Functional - Mobility Device: No device  Activity: To/from bathroom  Assist Level: Contact guard assistance  Functional Mobility Comments: Pt stated she was unsteady d/t alcohol withdrawal  Bed mobility  Supine to Sit: Modified independent  Sit to Supine: Modified independent  Scooting: Independent     Transfers  Stand Step Transfers: Contact guard assistance  Stand Pivot Transfers: Stand by assistance  Sit to stand: Contact guard assistance  Stand to sit: Contact guard assistance  Toilet Transfers  Toilet - Technique: Ambulating  Toilet Transfer: Stand by assistance      Assessment  Performance deficits / Impairments: Decreased functional mobility , Decreased ADL status, Decreased strength, Decreased endurance, Decreased balance, Decreased high-level IADLs  Treatment Diagnosis: Impaired self-care status. Prognosis: Fair  Decision Making: Low Complexity  Patient Education: OT POC, safety  REQUIRES OT FOLLOW UP: Yes  Discharge Recommendations: Home with assist PRN    Goals  Patient Goals   Patient goals : To feel better  Short term goals  Time Frame for Short term goals: By discharge  Short term goal 1: Pt will perform BADLs with IND and good safety awareness. Short term goal 2: Pt will perform functional transfers and functional mobility with IND and good safety awareness. Short term goal 3: Pt will stand for 6+ minutes with IND while engaging in functional task. Short term goal 4: Pt will participate in 10+ minutes of therapeutic ex/functional activities to promote increased independence with ADLs and mobility.     Plan  Safety Devices  Safety Devices in place: Yes  Type of devices: Call light within reach, Left in bed (Sitter present)     Plan  Times per week: 3-4 sessions  Times per day: Daily  Current Treatment Recommendations: Strengthening, Balance Training, Functional Mobility Training, Endurance Training, Safety Education & Training, Patient/Caregiver Education & Training, Self-Care / ADL    Equipment Recommendations  Equipment Needed:  (TBD)  OT Individual Minutes  Time In: 9128  Time Out: 0369  Minutes: 21    Electronically signed by AMARIS Ni on 12/12/17 at 11:08 AM

## 2017-12-12 NOTE — DISCHARGE SUMMARY
[address]    Discharge Summary     Patient ID: Abdulaziz Salmeron  :  1965   MRN: [id number]     ACCOUNT:  [de-identified]   Patient's PCP: Yolande Montes CNP  Admit Date: 2017   Discharge Date: 2017     Length of Stay: 1  Code Status:  Full Code  Admitting Physician: Riri Diez MD  Discharge Physician: Karly Melendez MD     Active Discharge Diagnoses:       Primary Problem  Suicidal ideation      [place] Problems    Diagnosis Date Noted    Suicidal ideation [R45.851] 2017    Alcohol abuse [F10.10] 2017       Admission Condition:  fair     Discharged Condition: good    Hospital Stay:       Hospital Course:  Abdulaziz Salmeron is a 47 y.o. female who was admitted for the management of  Acute alcohol intoxication and Suicidal ideation , presented to ER with BAL of .430. Mental Health Problem and Depression    She was seen by psychiatry and is admitted to [facility]. Significant therapeutic interventions: CIWA scale    Significant Diagnostic Studies:   Labs / Micro:  BMP:    Lab Results   Component Value Date    GLUCOSE 77 2017     2017    K 3.9 2017     2017    CO2 23 2017    ANIONGAP 12 2017    BUN 13 2017    CREATININE 0.54 2017    BUNCRER NOT REPORTED 2017    CALCIUM 7.0 2017    LABGLOM >60 2017    GFRAA >60 2017    GFR      2017    GFR NOT REPORTED 2017       ,    Radiology:    No results found. Consultations:    Consults:     Final Specialist Recommendations/Findings:   IP CONSULT TO INTERNAL MEDICINE  IP CONSULT TO SOCIAL WORK  IP CONSULT TO PSYCHIATRY      The patient was seen and examined on day of discharge and this discharge summary is in conjunction with any daily progress note from day of discharge.     Discharge plan:       Disposition: [facility]    Physician Follow Up:     Nelida Camp Josiecorneliamame, [address] [place] [address],  [address] [place] [address]  [phone]             Requiring Further Evaluation/Follow Up POST HOSPITALIZATION/Incidental Findings:     Diet: regular diet    Activity: As tolerated    Instructions to Patient:   - Take all medications as prescribed  - Follow up with PCP   - In case of any worsening condition please visit Emergency Room. Discharge Medications:      Medication List      CONTINUE taking these medications    MELOXICAM PO     sertraline 50 MG tablet  Commonly known as:  ZOLOFT     WELLBUTRIN PO            Time Spent on discharge is  15 mins in patient examination, evaluation, counseling as well as medication reconciliation, prescriptions for required medications, discharge plan and follow up. Electronically signed by   Michael Torres MD  12/12/2017  3:37 PM      Thank you Dr. Carolynn Ugarte CNP for the opportunity to be involved in this patient's care. IM Attending     Pt seen and examined before discharge   Discharge plan and medications were reviewed and agreed as documented by resident.   Time spent for discharge planning more than 30 minutes     Electronically signed by Oswald Webb MD on 12/13/2017 at 4:07 AM

## 2017-12-12 NOTE — PROGRESS NOTES
Psychiatry consultation. Chart reviewed and patient interviewed. This is a 45-year-old white female patient who was admitted on 11 December 2017 from emergency room because she has been feeling very depressed and despondent and hopeless overwhelmed and suicidal.  She has history of multiple psychiatric hospitalizations and poor compliance with treatment. She has history of alcohol dependence. She states that she relapsed recently and has been drinking quite extensively. She denies any medical problems. She reports that her father has history of psychiatric problems. She reports that she was born and raised in [place]. She has 3 years of college. 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 She was  for 3 years and  for 40 years. She does not have any children. She lives alone. On mental status examination She is of average height and built, cooperative and informative, talking clearly in a conversational tone. Her affect is appropriate and responds to questions spontaneously relevantly and coherently. She has low self-esteem expressing feelings of helplessness hopelessness and suicidal feelings. She denies any homicidal feelings delusions ideas of reference or hallucinations and none were observed during the examination alert and oriented to time place person and situation. Her memory for recent as well as remote events seems fair. Intellectually she is average. She has superficial judgment and partial insight    Diagnostic impression  : Major depression     #2    Alcohol dependence     Will admit her to inpatient psychiatry when she is medically stable.

## 2017-12-12 NOTE — PROGRESS NOTES
Dynamic: Good;- (no device)        Assessment   Body structures, Functions, Activity limitations: Decreased functional mobility ; Decreased safe awareness;Decreased balance  Assessment: continue per POC to maximize potential for safe D/C home  Treatment Diagnosis: impaired balance due to acute alcohol intoxication  Specific instructions for Next Treatment: 12-12-17 gait w/o AD 10' x 2 w/ CGA x 1, FALL RISK, suicidal ideation- has guard  Prognosis: Good  Decision Making: Low Complexity  History: admitted w/ blood alcohol . 430 amd suicidal ideation. Exam: ROM, MMT, balance and mobility assessments  Clinical Presentation: pt needing CGA for transfers and gait due to unsteadiness and tremors  Patient Education: POC  Barriers to Learning: none  REQUIRES PT FOLLOW UP: Yes  Activity Tolerance  Activity Tolerance:  (C/O lightheadedness, vomited in bathroom)  PT Equipment Recommendations  Equipment Needed:  (to be determined)     Discharge Recommendations:  Home with assist PRN (supervision on steps initially)      Plan   Plan  Times per week: daily x 2-3 days  Times per day: Daily  Specific instructions for Next Treatment: 12-12-17 gait w/o AD 10' x 2 w/ CGA x 1, FALL RISK, suicidal ideation- has guard  Current Treatment Recommendations: Strengthening, Home Exercise Program, Safety Education & Training, Balance Training, Patient/Caregiver Education & Training, Functional Mobility Training, Equipment Evaluation, Education, & procurement, Transfer Training, Gait Training, Stair training  Safety Devices  Type of devices: All fall risk precautions in place, Bed alarm in place, Call light within reach, Gait belt, Patient at risk for falls, Left in bed (guard present)    G-Code  PT G-Codes  Functional Assessment Tool Used: [place] Functional Outcomes  Score: 18  Functional Limitation: Mobility: Walking and moving around  Mobility: Walking and Moving Around Current Status ():  At least 20 percent but less than 40 percent

## 2017-12-12 NOTE — PLAN OF CARE
Problem: Falls - Risk of  Goal: Absence of falls  Outcome: Ongoing  No falls noted this shift. Patient ambulates with x1 staff assistance without difficulty. Guard at bedside, spent the night. Bed kept in low position. Safe environment maintained. Bedside table & call light in reach. Uses call light appropriately when needing assistance. Problem: Suicide risk  Goal: Provide patient with safe environment  Provide patient with safe environment   Outcome: Ongoing  NO SI on this shift. GUard remains at the bedside. She will discharge to [facility] most likely tomorrow.     Problem: Musculor/Skeletal Functional Status  Goal: Highest potential functional level  Outcome: Ongoing  CIWA scale on board    Problem: Nutrition  Goal: Optimal nutrition therapy  Outcome: Ongoing  She is up x 1 assist.

## 2017-12-12 NOTE — PLAN OF CARE
Problem: Nutrition  Goal: Optimal nutrition therapy  Outcome: Ongoing  Nutrition Problem: Predicted suboptimal energy intake  Intervention: Food and/or Nutrient Delivery: Continue current diet  Nutritional Goals: PO intakes to meet >75% estimated needs.

## 2017-12-12 NOTE — PROGRESS NOTES
Neurological: Positive for tremors. Negative for dizziness, weakness and headaches. Psychiatric/Behavioral: Positive for depression and suicidal ideas. Negative for hallucinations and substance abuse. The patient is nervous/anxious. Medications: Allergies:  No Known Allergies    Current Meds:   Scheduled Meds:    sertraline  50 mg Oral Daily    sodium chloride flush  10 mL Intravenous 2 times per day    enoxaparin  40 mg Subcutaneous Daily    folic acid, thiamine, multi-vitamin with vitamin K infusion   Intravenous Daily    nicotine  1 patch Transdermal Daily    famotidine  20 mg Oral BID    buPROPion  150 mg Oral Daily    valACYclovir  500 mg Oral BID    influenza virus vaccine  0.5 mL Intramuscular Once     Continuous Infusions:    sodium chloride 75 mL/hr at 17 0531     PRN Meds: sodium chloride flush, ondansetron, magnesium hydroxide, LORazepam **OR** LORazepam **OR** LORazepam **OR** LORazepam **OR** LORazepam **OR** LORazepam **OR** LORazepam **OR** LORazepam, traZODone    Data:     Past Medical History:   has a past medical history of Depression; Kidney disease; and Liver failure, acute. Social History:   reports that she has quit smoking. She does not have any smokeless tobacco history on file. She reports that she does not drink alcohol or use drugs. Family History: No family history on file. Vitals:  BP (!) 109/53   Pulse 78   Temp 97.2 °F (36.2 °C) (Oral)   Resp 14   Ht 5' 2\" (1.575 m)   Wt 190 lb (86.2 kg)   SpO2 96%   BMI 34.75 kg/m²   Temp (24hrs), Av °F (36.7 °C), Min:97.2 °F (36.2 °C), Max:98.6 °F (37 °C)    No results for input(s): POCGLU in the last 72 hours. I/O (24Hr):     Intake/Output Summary (Last 24 hours) at 17 1005  Last data filed at 17 0022   Gross per 24 hour   Intake             2343 ml   Output                0 ml   Net             2343 ml       Labs:    [unfilled]    No results found for: SPECIAL  No results found for: CULTURE    @[facility]@    Radiology:    No results found. Physical Examination:        Physical Exam   Constitutional: She is oriented to person, place, and time and well-developed, well-nourished, and in no distress. Eyes: Conjunctivae are normal. Pupils are equal, round, and reactive to light. Cardiovascular: Normal rate and regular rhythm. Pulmonary/Chest: Effort normal and breath sounds normal.   Abdominal: Soft. She exhibits no distension. There is no tenderness. Musculoskeletal: She exhibits no edema or tenderness. Neurological: She is alert and oriented to person, place, and time. She displays tremor. Skin: Skin is warm. Assessment:        Primary Problem  Suicidal ideation    Active Hospital Problems    Diagnosis Date Noted    Suicidal ideation [R45.851] 12/11/2017    Alcohol abuse [F10.10] 12/11/2017       Plan:        1. Suicidal Ideation  - Consult to Psychiatry - f/u recs    - Continue home medication  - Suicide precautions  - Sitter at bedside     2. Alcohol Abuse  - Ethanol level 430  - Patient has history of withdrawal seizures and DT's  - CIWA scale  - NS @ 75 ml/hr   - Banana bag  - Zofran PRN for nausea  - General diet     Perez Gonzalez MD  12/12/2017  10:05 AM       IM Attending    Pt seen and examined today   I have discussed the care of pt , including pertinent history and exam findings,  with the resident. I have reviewed the key elements of all parts of the encounter with the resident. I agree with the assessment, plan and orders as documented by the resident.     Electronically signed by Anali Simmons MD on 12/12/2017 at 12:42 PM

## 2017-12-12 NOTE — PROGRESS NOTES
SW was asked to see pt's mother per mother's request. Rosa Maria Merchant was unable to meet with her before she left. Message left on her cell  [phone].

## 2017-12-13 ENCOUNTER — HOSPITAL ENCOUNTER (INPATIENT)
Age: 52
LOS: 7 days | Discharge: HOME | DRG: 885 | End: 2017-12-20
Attending: PSYCHIATRY & NEUROLOGY | Admitting: PSYCHIATRY & NEUROLOGY
Payer: COMMERCIAL

## 2017-12-13 VITALS
SYSTOLIC BLOOD PRESSURE: 137 MMHG | HEART RATE: 80 BPM | TEMPERATURE: 98.5 F | WEIGHT: 190 LBS | HEIGHT: 62 IN | RESPIRATION RATE: 16 BRPM | BODY MASS INDEX: 34.96 KG/M2 | DIASTOLIC BLOOD PRESSURE: 86 MMHG | OXYGEN SATURATION: 99 %

## 2017-12-13 PROBLEM — F32.A DEPRESSION: Status: ACTIVE | Noted: 2017-12-13

## 2017-12-13 PROBLEM — F33.9 DEPRESSION, RECURRENT (HCC): Status: ACTIVE | Noted: 2017-12-13

## 2017-12-13 LAB
ABSOLUTE EOS #: 0.1 K/UL (ref 0–0.4)
ABSOLUTE IMMATURE GRANULOCYTE: ABNORMAL K/UL (ref 0–0.3)
ABSOLUTE LYMPH #: 1 K/UL (ref 1–4.8)
ABSOLUTE MONO #: 0.2 K/UL (ref 0.1–1.3)
ANION GAP SERPL CALCULATED.3IONS-SCNC: 13 MMOL/L (ref 9–17)
BASOPHILS # BLD: 0 % (ref 0–2)
BASOPHILS ABSOLUTE: 0 K/UL (ref 0–0.2)
BUN BLDV-MCNC: 4 MG/DL (ref 6–20)
BUN/CREAT BLD: ABNORMAL (ref 9–20)
CALCIUM SERPL-MCNC: 6.9 MG/DL (ref 8.6–10.4)
CHLORIDE BLD-SCNC: 105 MMOL/L (ref 98–107)
CO2: 22 MMOL/L (ref 20–31)
CREAT SERPL-MCNC: 0.44 MG/DL (ref 0.5–0.9)
DIFFERENTIAL TYPE: ABNORMAL
EOSINOPHILS RELATIVE PERCENT: 3 % (ref 0–4)
GFR AFRICAN AMERICAN: >60 ML/MIN
GFR NON-AFRICAN AMERICAN: >60 ML/MIN
GFR SERPL CREATININE-BSD FRML MDRD: ABNORMAL ML/MIN/{1.73_M2}
GFR SERPL CREATININE-BSD FRML MDRD: ABNORMAL ML/MIN/{1.73_M2}
GLUCOSE BLD-MCNC: 88 MG/DL (ref 70–99)
HCT VFR BLD CALC: 38.6 % (ref 36–46)
HEMOGLOBIN: 12.8 G/DL (ref 12–16)
IMMATURE GRANULOCYTES: ABNORMAL %
LYMPHOCYTES # BLD: 28 % (ref 24–44)
MCH RBC QN AUTO: 30.4 PG (ref 26–34)
MCHC RBC AUTO-ENTMCNC: 33.2 G/DL (ref 31–37)
MCV RBC AUTO: 91.7 FL (ref 80–100)
MONOCYTES # BLD: 6 % (ref 1–7)
PDW BLD-RTO: 16.6 % (ref 11.5–14.9)
PLATELET # BLD: 79 K/UL (ref 150–450)
PLATELET ESTIMATE: ABNORMAL
PMV BLD AUTO: 8.2 FL (ref 6–12)
POTASSIUM SERPL-SCNC: 3.2 MMOL/L (ref 3.7–5.3)
RBC # BLD: 4.21 M/UL (ref 4–5.2)
RBC # BLD: ABNORMAL 10*6/UL
SEG NEUTROPHILS: 63 % (ref 36–66)
SEGMENTED NEUTROPHILS ABSOLUTE COUNT: 2.4 K/UL (ref 1.3–9.1)
SODIUM BLD-SCNC: 140 MMOL/L (ref 135–144)
WBC # BLD: 3.8 K/UL (ref 3.5–11)
WBC # BLD: ABNORMAL 10*3/UL

## 2017-12-13 PROCEDURE — 1240000000 HC EMOTIONAL WELLNESS R&B

## 2017-12-13 PROCEDURE — 6360000002 HC RX W HCPCS: Performed by: STUDENT IN AN ORGANIZED HEALTH CARE EDUCATION/TRAINING PROGRAM

## 2017-12-13 PROCEDURE — 36415 COLL VENOUS BLD VENIPUNCTURE: CPT

## 2017-12-13 PROCEDURE — 99238 HOSP IP/OBS DSCHRG MGMT 30/<: CPT | Performed by: INTERNAL MEDICINE

## 2017-12-13 PROCEDURE — G0008 ADMIN INFLUENZA VIRUS VAC: HCPCS | Performed by: INTERNAL MEDICINE

## 2017-12-13 PROCEDURE — 90686 IIV4 VACC NO PRSV 0.5 ML IM: CPT | Performed by: INTERNAL MEDICINE

## 2017-12-13 PROCEDURE — 6360000002 HC RX W HCPCS: Performed by: INTERNAL MEDICINE

## 2017-12-13 PROCEDURE — 2580000003 HC RX 258: Performed by: STUDENT IN AN ORGANIZED HEALTH CARE EDUCATION/TRAINING PROGRAM

## 2017-12-13 PROCEDURE — 80048 BASIC METABOLIC PNL TOTAL CA: CPT

## 2017-12-13 PROCEDURE — 6370000000 HC RX 637 (ALT 250 FOR IP): Performed by: STUDENT IN AN ORGANIZED HEALTH CARE EDUCATION/TRAINING PROGRAM

## 2017-12-13 PROCEDURE — 2500000003 HC RX 250 WO HCPCS: Performed by: STUDENT IN AN ORGANIZED HEALTH CARE EDUCATION/TRAINING PROGRAM

## 2017-12-13 PROCEDURE — 85025 COMPLETE CBC W/AUTO DIFF WBC: CPT

## 2017-12-13 RX ORDER — TRAZODONE HYDROCHLORIDE 100 MG/1
100 TABLET ORAL NIGHTLY
Status: ON HOLD | COMMUNITY
End: 2017-12-19 | Stop reason: HOSPADM

## 2017-12-13 RX ORDER — POTASSIUM CHLORIDE 20MEQ/15ML
40 LIQUID (ML) ORAL PRN
Status: DISCONTINUED | OUTPATIENT
Start: 2017-12-13 | End: 2017-12-13 | Stop reason: HOSPADM

## 2017-12-13 RX ORDER — POTASSIUM CHLORIDE 20 MEQ/1
40 TABLET, EXTENDED RELEASE ORAL PRN
Status: DISCONTINUED | OUTPATIENT
Start: 2017-12-13 | End: 2017-12-13 | Stop reason: HOSPADM

## 2017-12-13 RX ORDER — POTASSIUM CHLORIDE 7.45 MG/ML
10 INJECTION INTRAVENOUS PRN
Status: DISCONTINUED | OUTPATIENT
Start: 2017-12-13 | End: 2017-12-13 | Stop reason: HOSPADM

## 2017-12-13 RX ORDER — VALACYCLOVIR HYDROCHLORIDE 500 MG/1
500 TABLET, FILM COATED ORAL 2 TIMES DAILY
COMMUNITY

## 2017-12-13 RX ADMIN — SERTRALINE HYDROCHLORIDE 50 MG: 50 TABLET ORAL at 07:46

## 2017-12-13 RX ADMIN — CHLORDIAZEPOXIDE HYDROCHLORIDE 50 MG: 25 CAPSULE ORAL at 09:41

## 2017-12-13 RX ADMIN — VALACYCLOVIR 500 MG: 500 TABLET, FILM COATED ORAL at 07:45

## 2017-12-13 RX ADMIN — POTASSIUM CHLORIDE 40 MEQ: 20 TABLET, EXTENDED RELEASE ORAL at 09:48

## 2017-12-13 RX ADMIN — INFLUENZA VIRUS VACCINE 0.5 ML: 15; 15; 15; 15 SUSPENSION INTRAMUSCULAR at 11:14

## 2017-12-13 RX ADMIN — CHLORDIAZEPOXIDE HYDROCHLORIDE 50 MG: 25 CAPSULE ORAL at 20:19

## 2017-12-13 RX ADMIN — LORAZEPAM 1 MG: 1 TABLET ORAL at 00:26

## 2017-12-13 RX ADMIN — FAMOTIDINE 20 MG: 20 TABLET, FILM COATED ORAL at 07:46

## 2017-12-13 RX ADMIN — ENOXAPARIN SODIUM 40 MG: 40 INJECTION SUBCUTANEOUS at 07:46

## 2017-12-13 RX ADMIN — SODIUM CHLORIDE: 9 INJECTION, SOLUTION INTRAVENOUS at 07:46

## 2017-12-13 RX ADMIN — FAMOTIDINE 20 MG: 20 TABLET, FILM COATED ORAL at 20:19

## 2017-12-13 RX ADMIN — FOLIC ACID: 5 INJECTION, SOLUTION INTRAMUSCULAR; INTRAVENOUS; SUBCUTANEOUS at 09:41

## 2017-12-13 RX ADMIN — BUPROPION HYDROCHLORIDE 150 MG: 150 TABLET, FILM COATED, EXTENDED RELEASE ORAL at 07:45

## 2017-12-13 RX ADMIN — LORAZEPAM 2 MG: 1 TABLET ORAL at 20:19

## 2017-12-13 RX ADMIN — VALACYCLOVIR 500 MG: 500 TABLET, FILM COATED ORAL at 20:19

## 2017-12-13 ASSESSMENT — SLEEP AND FATIGUE QUESTIONNAIRES
AVERAGE NUMBER OF SLEEP HOURS: 6
DIFFICULTY ARISING: NO
DO YOU USE A SLEEP AID: YES
DIFFICULTY STAYING ASLEEP: YES
RESTFUL SLEEP: NO
DO YOU HAVE DIFFICULTY SLEEPING: YES
SLEEP PATTERN: DIFFICULTY FALLING ASLEEP;DISTURBED/INTERRUPTED SLEEP
DIFFICULTY FALLING ASLEEP: YES

## 2017-12-13 ASSESSMENT — LIFESTYLE VARIABLES: HISTORY_ALCOHOL_USE: YES

## 2017-12-13 ASSESSMENT — PAIN SCALES - GENERAL: PAINLEVEL_OUTOF10: 0

## 2017-12-13 ASSESSMENT — PATIENT HEALTH QUESTIONNAIRE - PHQ9: SUM OF ALL RESPONSES TO PHQ QUESTIONS 1-9: 15

## 2017-12-13 NOTE — PROGRESS NOTES
Neurological: Positive for tremors. Negative for dizziness, weakness and headaches. Psychiatric/Behavioral: Positive for depression and suicidal ideas. Negative for hallucinations and substance abuse. The patient is nervous/anxious. Medications: Allergies:  No Known Allergies    Current Meds:   Scheduled Meds:    chlordiazePOXIDE  50 mg Oral BID    sertraline  50 mg Oral Daily    sodium chloride flush  10 mL Intravenous 2 times per day    enoxaparin  40 mg Subcutaneous Daily    folic acid, thiamine, multi-vitamin with vitamin K infusion   Intravenous Daily    nicotine  1 patch Transdermal Daily    famotidine  20 mg Oral BID    buPROPion  150 mg Oral Daily    valACYclovir  500 mg Oral BID    influenza virus vaccine  0.5 mL Intramuscular Once     Continuous Infusions:    sodium chloride 75 mL/hr at 17 0746     PRN Meds: potassium chloride **OR** potassium chloride **OR** potassium chloride, sodium chloride flush, ondansetron, magnesium hydroxide, LORazepam **OR** LORazepam **OR** LORazepam **OR** LORazepam **OR** LORazepam **OR** LORazepam **OR** LORazepam **OR** LORazepam, traZODone    Data:     Past Medical History:   has a past medical history of Depression; Kidney disease; and Liver failure, acute. Social History:   reports that she has quit smoking. She does not have any smokeless tobacco history on file. She reports that she does not drink alcohol or use drugs. Family History: No family history on file. Vitals:  /80   Pulse 84   Temp 97.9 °F (36.6 °C)   Resp 18   Ht 5' 2\" (1.575 m)   Wt 190 lb (86.2 kg)   SpO2 99%   BMI 34.75 kg/m²   Temp (24hrs), Av °F (36.7 °C), Min:97.9 °F (36.6 °C), Max:98.2 °F (36.8 °C)    No results for input(s): POCGLU in the last 72 hours. I/O (24Hr):     Intake/Output Summary (Last 24 hours) at 17 0800  Last data filed at 17 0551   Gross per 24 hour   Intake             3003 ml   Output                0 ml   Net 3003 ml       Labs:    [unfilled]    No results found for: SPECIAL  No results found for: CULTURE    [unfilled]    Radiology:    No results found. Physical Examination:        Physical Exam   Constitutional: She is oriented to person, place, and time and well-developed, well-nourished, and in no distress. Eyes: Conjunctivae are normal. Pupils are equal, round, and reactive to light. Cardiovascular: Normal rate and regular rhythm. Pulmonary/Chest: Effort normal and breath sounds normal.   Abdominal: Soft. She exhibits no distension. There is no tenderness. Musculoskeletal: She exhibits no edema or tenderness. Neurological: She is alert and oriented to person, place, and time. She displays tremor. Skin: Skin is warm. Assessment:        Primary Problem  Suicidal ideation    Active Hospital Problems    Diagnosis Date Noted    Suicidal ideation [R45.851] 12/11/2017    Alcohol abuse [F10.10] 12/11/2017       Plan:        1. Suicidal Ideation  - Consult to Psychiatry - admit patient to [facility] once medically stable   - Continue home medication  - Suicide precautions  - Sitter at bedside     2. Alcohol Abuse  - Ethanol level 430  - Librium added   - Patient has history of withdrawal seizures and DT's  - CIWA scale  - NS @ 75 ml/hr   - Banana bag  - Zofran PRN for nausea  - General diet     Elena Denney MD  12/13/2017  8:00 AM       IM Attending    Pt seen and examined today   I have discussed the care of pt , including pertinent history and exam findings,  with the resident. I have reviewed the key elements of all parts of the encounter with the resident. I agree with the assessment, plan and orders as documented by the resident.     Electronically signed by Juan Ramon Virk MD on 12/13/2017 at 2:16 PM

## 2017-12-13 NOTE — FLOWSHEET NOTE
12/13/17 1350   Encounter Summary   Services provided to: Patient   Referral/Consult From: [facility]   Continue Visiting (12/13/17)   Volunteer Visit Yes   Complexity of Encounter Low   Length of Encounter 15 minutes   Routine   Type Follow up   Spiritual/Moravian   Type Spiritual support   Intervention Communion;Prayer   Sacraments   Communion Patient received communion

## 2017-12-14 PROBLEM — F33.9 DEPRESSION, RECURRENT (HCC): Status: RESOLVED | Noted: 2017-12-13 | Resolved: 2017-12-14

## 2017-12-14 PROBLEM — F32.A DEPRESSION: Status: RESOLVED | Noted: 2017-12-13 | Resolved: 2017-12-14

## 2017-12-14 PROBLEM — F31.9 DEPRESSED BIPOLAR I DISORDER (HCC): Status: ACTIVE | Noted: 2017-12-14

## 2017-12-14 PROCEDURE — 6370000000 HC RX 637 (ALT 250 FOR IP): Performed by: PSYCHIATRY & NEUROLOGY

## 2017-12-14 PROCEDURE — 1240000000 HC EMOTIONAL WELLNESS R&B

## 2017-12-14 RX ORDER — ARIPIPRAZOLE 10 MG/1
10 TABLET ORAL DAILY
Status: DISCONTINUED | OUTPATIENT
Start: 2017-12-14 | End: 2017-12-17

## 2017-12-14 RX ORDER — SERTRALINE HYDROCHLORIDE 100 MG/1
100 TABLET, FILM COATED ORAL DAILY
Status: DISCONTINUED | OUTPATIENT
Start: 2017-12-14 | End: 2017-12-18

## 2017-12-14 RX ORDER — TRAZODONE HYDROCHLORIDE 100 MG/1
100 TABLET ORAL NIGHTLY
Status: DISCONTINUED | OUTPATIENT
Start: 2017-12-14 | End: 2017-12-20 | Stop reason: HOSPADM

## 2017-12-14 RX ORDER — ACYCLOVIR 200 MG/1
400 CAPSULE ORAL 3 TIMES DAILY
Status: DISCONTINUED | OUTPATIENT
Start: 2017-12-14 | End: 2017-12-20 | Stop reason: HOSPADM

## 2017-12-14 RX ADMIN — ACYCLOVIR 400 MG: 200 CAPSULE ORAL at 18:05

## 2017-12-14 RX ADMIN — ACYCLOVIR 400 MG: 200 CAPSULE ORAL at 21:10

## 2017-12-14 RX ADMIN — ARIPIPRAZOLE 10 MG: 10 TABLET ORAL at 15:52

## 2017-12-14 RX ADMIN — TRAZODONE HYDROCHLORIDE 100 MG: 100 TABLET ORAL at 21:10

## 2017-12-14 RX ADMIN — SERTRALINE 100 MG: 100 TABLET, FILM COATED ORAL at 15:52

## 2017-12-14 ASSESSMENT — SLEEP AND FATIGUE QUESTIONNAIRES
DIFFICULTY STAYING ASLEEP: YES
RESTFUL SLEEP: NO
AVERAGE NUMBER OF SLEEP HOURS: 4
DIFFICULTY ARISING: NO
SLEEP PATTERN: RESTLESSNESS;DISTURBED/INTERRUPTED SLEEP;DIFFICULTY FALLING ASLEEP
DO YOU HAVE DIFFICULTY SLEEPING: YES
DO YOU USE A SLEEP AID: YES
DIFFICULTY FALLING ASLEEP: YES

## 2017-12-14 ASSESSMENT — LIFESTYLE VARIABLES: HISTORY_ALCOHOL_USE: YES

## 2017-12-14 ASSESSMENT — PATIENT HEALTH QUESTIONNAIRE - PHQ9: SUM OF ALL RESPONSES TO PHQ QUESTIONS 1-9: 14

## 2017-12-14 NOTE — PROGRESS NOTES
Seclusion       No contraindication     Restraints      No contraindications    Psychiatric evaluation: This is a 81-year-old white female patient who was admitted on 13 December 2017 from medical floor where she had been treated for acute intoxication and was detoxed but she was expressing suicidal feelings as well as intentions as she reports that she has been feeling very depressed and despondent and hopeless overwhelmed or suicidal.  She states that she has been suffering from depression for last more than 20 years and has been compliant with treatment but she continues to have racing thoughts and mood swings and goes back to drinking. 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 She denies any medical problems. She reports that her father as well as grandmother have history of psychiatric problems. She reports that she was born and raised in [place]. She graduated from high school and has a 3 years of college. 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 She was  for 3 years and has been  for 14 years. She doesn't have any children. She lives alone. On mental status examination She is of average height and built, cooperative and informative, talking  slowly in a monotonous tone. Her affect is flat and psychomotor activity is retarded showing poverty of thought and blocking. She  has low self-esteem expressing feelings of helplessness, hopelessness, and suicidal feelings. She  denies any homicidal feelings, delusions, ideas of reference or hallucinations and none were observed during the examination. She  is alert and oriented to time place person and situation. Her  memory for recent as well as remote events seems fair. IntellectuallyShe is average. She  has superficial judgment and partial insight. Diagnostic impression  : Bipolar disorder     Treatment plan: Medication management, supportive therapy and hospital milieu. Side effects of medications reviewed and medication education provided.     ELOS:5-7 days
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
## 2017-12-15 PROCEDURE — 1240000000 HC EMOTIONAL WELLNESS R&B

## 2017-12-15 PROCEDURE — 6370000000 HC RX 637 (ALT 250 FOR IP): Performed by: PSYCHIATRY & NEUROLOGY

## 2017-12-15 RX ORDER — HYDROXYZINE HYDROCHLORIDE 25 MG/1
25 TABLET, FILM COATED ORAL 3 TIMES DAILY PRN
Status: DISCONTINUED | OUTPATIENT
Start: 2017-12-15 | End: 2017-12-20 | Stop reason: HOSPADM

## 2017-12-15 RX ADMIN — ACYCLOVIR 400 MG: 200 CAPSULE ORAL at 13:17

## 2017-12-15 RX ADMIN — SERTRALINE 100 MG: 100 TABLET, FILM COATED ORAL at 08:30

## 2017-12-15 RX ADMIN — ARIPIPRAZOLE 10 MG: 10 TABLET ORAL at 08:30

## 2017-12-15 RX ADMIN — ACYCLOVIR 400 MG: 200 CAPSULE ORAL at 21:09

## 2017-12-15 RX ADMIN — TRAZODONE HYDROCHLORIDE 100 MG: 100 TABLET ORAL at 21:09

## 2017-12-15 RX ADMIN — ACYCLOVIR 400 MG: 200 CAPSULE ORAL at 08:30

## 2017-12-15 NOTE — FLOWSHEET NOTE
12/15/17 1601   Encounter Summary   Services provided to: Patient   Referral/Consult From: Rounding   Continue Visiting (12/15/17)   Complexity of Encounter Low   Length of Encounter 15 minutes   Routine   Type Follow up   Spiritual/Bahai   Type Ritual   Sacraments   Sacrament of Sick-Anointing Anointed  (Yumiko Green 12/15/17)

## 2017-12-16 PROCEDURE — 6370000000 HC RX 637 (ALT 250 FOR IP): Performed by: PSYCHIATRY & NEUROLOGY

## 2017-12-16 PROCEDURE — 1240000000 HC EMOTIONAL WELLNESS R&B

## 2017-12-16 RX ADMIN — ARIPIPRAZOLE 10 MG: 10 TABLET ORAL at 08:30

## 2017-12-16 RX ADMIN — ACYCLOVIR 400 MG: 200 CAPSULE ORAL at 08:30

## 2017-12-16 RX ADMIN — ACYCLOVIR 400 MG: 200 CAPSULE ORAL at 15:04

## 2017-12-16 RX ADMIN — ACYCLOVIR 400 MG: 200 CAPSULE ORAL at 21:41

## 2017-12-16 RX ADMIN — SERTRALINE 100 MG: 100 TABLET, FILM COATED ORAL at 08:30

## 2017-12-17 PROCEDURE — 6370000000 HC RX 637 (ALT 250 FOR IP): Performed by: PSYCHIATRY & NEUROLOGY

## 2017-12-17 PROCEDURE — 1240000000 HC EMOTIONAL WELLNESS R&B

## 2017-12-17 RX ORDER — QUETIAPINE FUMARATE 200 MG/1
200 TABLET, FILM COATED ORAL NIGHTLY
Status: DISCONTINUED | OUTPATIENT
Start: 2017-12-17 | End: 2017-12-20 | Stop reason: HOSPADM

## 2017-12-17 RX ORDER — DIAPER,BRIEF,INFANT-TODD,DISP
EACH MISCELLANEOUS 2 TIMES DAILY
Status: DISCONTINUED | OUTPATIENT
Start: 2017-12-17 | End: 2017-12-20 | Stop reason: HOSPADM

## 2017-12-17 RX ORDER — IBUPROFEN 800 MG/1
800 TABLET ORAL EVERY 8 HOURS PRN
Status: DISCONTINUED | OUTPATIENT
Start: 2017-12-17 | End: 2017-12-20 | Stop reason: HOSPADM

## 2017-12-17 RX ADMIN — NICOTINE POLACRILEX 2 MG: 2 GUM, CHEWING BUCCAL at 13:13

## 2017-12-17 RX ADMIN — HYDROCORTISONE: 1 CREAM TOPICAL at 20:17

## 2017-12-17 RX ADMIN — NICOTINE POLACRILEX 2 MG: 2 GUM, CHEWING BUCCAL at 20:16

## 2017-12-17 RX ADMIN — NICOTINE POLACRILEX 2 MG: 2 GUM, CHEWING BUCCAL at 11:49

## 2017-12-17 RX ADMIN — NICOTINE POLACRILEX 2 MG: 2 GUM, CHEWING BUCCAL at 14:56

## 2017-12-17 RX ADMIN — QUETIAPINE FUMARATE 200 MG: 200 TABLET ORAL at 21:47

## 2017-12-17 RX ADMIN — ARIPIPRAZOLE 10 MG: 10 TABLET ORAL at 08:49

## 2017-12-17 RX ADMIN — ACYCLOVIR 400 MG: 200 CAPSULE ORAL at 14:30

## 2017-12-17 RX ADMIN — NICOTINE POLACRILEX 2 MG: 2 GUM, CHEWING BUCCAL at 17:00

## 2017-12-17 RX ADMIN — SERTRALINE 100 MG: 100 TABLET, FILM COATED ORAL at 08:48

## 2017-12-17 RX ADMIN — ACYCLOVIR 400 MG: 200 CAPSULE ORAL at 08:48

## 2017-12-17 RX ADMIN — ACYCLOVIR 400 MG: 200 CAPSULE ORAL at 21:47

## 2017-12-17 RX ADMIN — NICOTINE POLACRILEX 2 MG: 2 GUM, CHEWING BUCCAL at 21:58

## 2017-12-17 RX ADMIN — IBUPROFEN 800 MG: 800 TABLET ORAL at 19:45

## 2017-12-17 ASSESSMENT — PAIN DESCRIPTION - ORIENTATION: ORIENTATION: RIGHT

## 2017-12-17 ASSESSMENT — PAIN DESCRIPTION - LOCATION: LOCATION: SHOULDER

## 2017-12-17 ASSESSMENT — PAIN DESCRIPTION - PAIN TYPE: TYPE: ACUTE PAIN

## 2017-12-17 ASSESSMENT — PAIN SCALES - GENERAL: PAINLEVEL_OUTOF10: 8

## 2017-12-17 NOTE — PROGRESS NOTES
Patient states the she is feeling slightly better and starting to participate in the activities and interacting with peers. Still experience low stress tolerance, irritability, and expressing suicidal feelings. Affect-Brighter  . Mood -   Dysphoric  Thought process -  Poorly productive,blocking but improving  Cognition -  Improving  Memory- Recent-Adequate                Remote-OK  Orientation-OK                                               Insight-Partial  Judgement-Superficial                                                Attempted to develop insight  . Medications reviewed. Side effects of medications reviewed and medication education provided  No side effects including akathisia,tremers,dystonia or orthostasis reported or observed. Plan: Stabilization with antidepressant and mood stabilization medications,group therapy and develop social skills,discharge to community. Reassurance and support provided. Encouraged to interact with peers  and participate in activities.

## 2017-12-17 NOTE — FLOWSHEET NOTE
12/17/17 1038   Encounter Summary   Services provided to: Patient   Referral/Consult From: Sky   Continue Visiting (12/17/17)   Complexity of Encounter Low   Length of Encounter 15 minutes   Routine   Type Follow up   Spiritual/Evangelical   Type Spiritual support   Intervention Communion;Prayer  (Patient visited by Fr. Kathia Jefferson)   [address]   Communion Patient received communion

## 2017-12-18 PROCEDURE — 6370000000 HC RX 637 (ALT 250 FOR IP): Performed by: PSYCHIATRY & NEUROLOGY

## 2017-12-18 PROCEDURE — 1240000000 HC EMOTIONAL WELLNESS R&B

## 2017-12-18 RX ADMIN — ACYCLOVIR 400 MG: 200 CAPSULE ORAL at 21:54

## 2017-12-18 RX ADMIN — ACYCLOVIR 400 MG: 200 CAPSULE ORAL at 13:33

## 2017-12-18 RX ADMIN — HYDROCORTISONE: 1 CREAM TOPICAL at 08:10

## 2017-12-18 RX ADMIN — ACYCLOVIR 400 MG: 200 CAPSULE ORAL at 08:09

## 2017-12-18 RX ADMIN — NICOTINE POLACRILEX 2 MG: 2 GUM, CHEWING BUCCAL at 13:17

## 2017-12-18 RX ADMIN — IBUPROFEN 800 MG: 800 TABLET ORAL at 20:19

## 2017-12-18 RX ADMIN — SERTRALINE 100 MG: 100 TABLET, FILM COATED ORAL at 08:09

## 2017-12-18 RX ADMIN — NICOTINE POLACRILEX 2 MG: 2 GUM, CHEWING BUCCAL at 09:24

## 2017-12-18 RX ADMIN — NICOTINE POLACRILEX 2 MG: 2 GUM, CHEWING BUCCAL at 21:14

## 2017-12-18 RX ADMIN — QUETIAPINE FUMARATE 200 MG: 200 TABLET ORAL at 21:54

## 2017-12-18 RX ADMIN — NICOTINE POLACRILEX 2 MG: 2 GUM, CHEWING BUCCAL at 18:56

## 2017-12-18 ASSESSMENT — PAIN SCALES - GENERAL
PAINLEVEL_OUTOF10: 7
PAINLEVEL_OUTOF10: 8

## 2017-12-18 NOTE — H&P
HISTORY and [address]         NAME:  Manjit Stark  MRN: [id number]   YOB: 1965   Date: 12/18/2017   Age: 47 y.o. Gender: female       COMPLAINT AND PRESENT HISTORY:      Manjit Stark is 47 y.o.,  female, admitted because of Bipolar Disorder. Pt has suicidal ideation, Pt has no particular plans, but states that she had no will to live.,  Pt denies any homicidal ideation. Pt has no  history of previous suicide attempts. Pt feels hopeless, helpless, worthless, lack of interest, loss of energy. Poor insight. Pt has poor sleep, loss of appetite, poor concentration and memory. No current auditory, visual or tactile hallucinations. Patients current stressors include relapse of Alcohol, while on Zoloft and states that \"depression just kicked in\". Patient admits to alcohol abuse, and has had off and on episodes of relapse. Pt admits to compliancy of taking her Zoloft, but believes that it should be adjusted, as she continues to have feelings of depression and suicidal tendencies. alcohol or substance abuse. Patient lives alone in her apartment. Pt has been compliant with the psychiatric medications. DIAGNOSTIC RESULTS   Radiology:     EKG: normal sinus rhythm. Labs:  CBC: No results for input(s): WBC, HGB, PLT in the last 72 hours. BMP:  No results for input(s): NA, K, CL, CO2, BUN, CREATININE, GLUCOSE in the last 72 hours. Hepatic: No results for input(s): AST, ALT, ALB, BILITOT, ALKPHOS in the last 72 hours. CARDIAC ENZY: No results for input(s): CKTOTAL, CKMB, CKMBINDEX, TROPONINT, MYOGLOBIN in the last 72 hours. INR: No results for input(s): INR in the last 72 hours. BNP: No results for input(s): BNP in the last 72 hours. ABGs: No results found for: PHART, PO2ART, NOR6NAU  Lipids: No results for input(s): CHOL, HDL in the last 72 hours.     Invalid input(s): LDLCALCU  U/A:No results found for: NITRITE, COLORU, [address], RBCUA, MUCUS, BACTERIA, CLARITYU, SPECGRAV, LEUKOCYTESUR, BLOODU, GLUCOSEU, AMORPHOUS    PAST MEDICAL HISTORY     Past Medical History:   Diagnosis Date    Depression     Kidney disease     acute    Liver failure, acute      Pt denies any history of Diabetes mellitus type 2, hypertension, stroke, heart disease, COPD, Asthma, GERD, HLD, Cancer, Seizures,Thyroid disease, TB, or Substance abuse. SURGICAL HISTORY       Past Surgical History:   Procedure Laterality Date    COLONOSCOPY  2017    DILATION AND CURETTAGE OF UTERUS      for     HAND SURGERY Right     for fracture, hardware removed    MA COLON CA SCRN NOT HI RSK IND N/A 7/3/2017    COLONOSCOPY performed by José Manuel Kamara DO at [facility]     History reviewed. No pertinent family history. SOCIAL HISTORY       Social History     Social History    Marital status:      Spouse name: N/A    Number of children: N/A    Years of education: N/A     Social History Main Topics    Smoking status: Former Smoker    Smokeless tobacco: Never Used      Comment: Patient is a non-smoker    Alcohol use No      Comment: hx of heavy drinking, last drink 2015    Drug use: No      Comment: hx of    Sexual activity: Not Asked     Other Topics Concern    None     Social History Narrative    None           REVIEW OF SYSTEMS      No Known Allergies    No current facility-administered medications on file prior to encounter. Current Outpatient Prescriptions on File Prior to Encounter   Medication Sig Dispense Refill    sertraline (ZOLOFT) 50 MG tablet Take 50 mg by mouth daily      BuPROPion HCl (WELLBUTRIN PO) Take 150 mg by mouth daily       MELOXICAM PO Take 1 tablet by mouth 2 times daily                      General health:  Fairly good. No fever or chills. Skin:  No itching, redness or rash. Head, eyes, ears, nose, throat:  No headache, epistaxis, rhinorrhea hearing loss or sore throat.        Neck:  No pain, Gordon. No facial droop, tongue protrudes centrally, no slurring of the speech.      PROVISIONAL DIAGNOSES:      Principal Problem:    Depressed bipolar I disorder ([facility] [address]      LUPE GARCIA CNP on 12/18/2017 at 1:37 PM

## 2017-12-19 PROCEDURE — 6370000000 HC RX 637 (ALT 250 FOR IP): Performed by: PSYCHIATRY & NEUROLOGY

## 2017-12-19 PROCEDURE — 1240000000 HC EMOTIONAL WELLNESS R&B

## 2017-12-19 RX ORDER — TRAZODONE HYDROCHLORIDE 100 MG/1
100 TABLET ORAL NIGHTLY
Qty: 30 TABLET | Refills: 0 | Status: SHIPPED | OUTPATIENT
Start: 2017-12-19

## 2017-12-19 RX ORDER — QUETIAPINE FUMARATE 200 MG/1
200 TABLET, FILM COATED ORAL NIGHTLY
Qty: 30 TABLET | Refills: 0 | Status: SHIPPED | OUTPATIENT
Start: 2017-12-19

## 2017-12-19 RX ADMIN — ACYCLOVIR 400 MG: 200 CAPSULE ORAL at 20:57

## 2017-12-19 RX ADMIN — HYDROCORTISONE: 1 CREAM TOPICAL at 08:06

## 2017-12-19 RX ADMIN — NICOTINE POLACRILEX 2 MG: 2 GUM, CHEWING BUCCAL at 15:29

## 2017-12-19 RX ADMIN — ACYCLOVIR 400 MG: 200 CAPSULE ORAL at 08:04

## 2017-12-19 RX ADMIN — SERTRALINE 150 MG: 100 TABLET, FILM COATED ORAL at 08:04

## 2017-12-19 RX ADMIN — ACYCLOVIR 400 MG: 200 CAPSULE ORAL at 13:19

## 2017-12-19 RX ADMIN — IBUPROFEN 800 MG: 800 TABLET ORAL at 13:20

## 2017-12-19 RX ADMIN — NICOTINE POLACRILEX 2 MG: 2 GUM, CHEWING BUCCAL at 08:05

## 2017-12-19 RX ADMIN — QUETIAPINE FUMARATE 200 MG: 200 TABLET ORAL at 20:57

## 2017-12-19 RX ADMIN — NICOTINE POLACRILEX 2 MG: 2 GUM, CHEWING BUCCAL at 18:00

## 2017-12-19 RX ADMIN — NICOTINE POLACRILEX 2 MG: 2 GUM, CHEWING BUCCAL at 09:22

## 2017-12-19 RX ADMIN — NICOTINE POLACRILEX 2 MG: 2 GUM, CHEWING BUCCAL at 11:55

## 2017-12-19 RX ADMIN — HYDROCORTISONE: 1 CREAM TOPICAL at 20:57

## 2017-12-19 RX ADMIN — NICOTINE POLACRILEX 2 MG: 2 GUM, CHEWING BUCCAL at 20:36

## 2017-12-19 ASSESSMENT — PAIN SCALES - GENERAL: PAINLEVEL_OUTOF10: 8

## 2017-12-20 VITALS
HEART RATE: 99 BPM | WEIGHT: 190 LBS | SYSTOLIC BLOOD PRESSURE: 112 MMHG | HEIGHT: 62 IN | RESPIRATION RATE: 14 BRPM | DIASTOLIC BLOOD PRESSURE: 82 MMHG | BODY MASS INDEX: 34.96 KG/M2 | TEMPERATURE: 97.7 F

## 2017-12-20 PROCEDURE — 6370000000 HC RX 637 (ALT 250 FOR IP): Performed by: PSYCHIATRY & NEUROLOGY

## 2017-12-20 PROCEDURE — 5130000000 HC BRIDGE APPOINTMENT: Performed by: COUNSELOR

## 2017-12-20 RX ADMIN — ACYCLOVIR 400 MG: 200 CAPSULE ORAL at 08:28

## 2017-12-20 RX ADMIN — NICOTINE POLACRILEX 2 MG: 2 GUM, CHEWING BUCCAL at 08:28

## 2017-12-20 RX ADMIN — NICOTINE POLACRILEX 2 MG: 2 GUM, CHEWING BUCCAL at 13:15

## 2017-12-20 RX ADMIN — ACYCLOVIR 400 MG: 200 CAPSULE ORAL at 15:33

## 2017-12-20 RX ADMIN — HYDROCORTISONE: 1 CREAM TOPICAL at 08:30

## 2017-12-20 RX ADMIN — NICOTINE POLACRILEX 2 MG: 2 GUM, CHEWING BUCCAL at 15:36

## 2017-12-20 RX ADMIN — NICOTINE POLACRILEX 2 MG: 2 GUM, CHEWING BUCCAL at 11:44

## 2017-12-20 RX ADMIN — SERTRALINE 150 MG: 100 TABLET, FILM COATED ORAL at 08:28

## 2017-12-20 RX ADMIN — NICOTINE POLACRILEX 2 MG: 2 GUM, CHEWING BUCCAL at 10:41

## 2017-12-20 RX ADMIN — IBUPROFEN 800 MG: 800 TABLET ORAL at 15:56

## 2017-12-20 ASSESSMENT — PAIN DESCRIPTION - PAIN TYPE: TYPE: ACUTE PAIN

## 2017-12-20 ASSESSMENT — PAIN DESCRIPTION - LOCATION: LOCATION: SHOULDER

## 2017-12-20 ASSESSMENT — PAIN SCALES - GENERAL
PAINLEVEL_OUTOF10: 8
PAINLEVEL_OUTOF10: 5

## 2017-12-20 ASSESSMENT — PAIN DESCRIPTION - ORIENTATION: ORIENTATION: RIGHT

## 2017-12-20 NOTE — PROGRESS NOTES
Psychoeducation Group Note    Date: 12/20/17  Start Time: 1100  End Time: 1145    Number Participants in Group:  7/21    Goal:  Patient will demonstrate increased interpersonal interaction   Topic: Leisure awareness     Discipline Responsible:   OT  AT  [facility]. x RT  Other       Participation Level:     None  Minimal   x Active Listener x Interactive    Monopolizing         Participation Quality:  x Appropriate  Inappropriate   x       Attentive        Intrusive          Sharing        Resistant          Supportive        Lethargic       Affective:   x Congruent  Incongruent  Blunted  Flat    Constricted  Anxious  Elated  Angry    Labile  Depressed  Other         Cognitive:  x Alert x Oriented PPTP     Concentration x G  F  P   Attention Span x G  F  P   Short-Term Memory  G  F  P   Long-Term Memory  G  F  P   ProblemSolving/  Decision Making x G  F  P   Ability to Process  Information x G  F  P      Contributing Factors             Delusional             Hallucinating             Flight of Ideas             Other:       Modes of Intervention:  x Education  Support  Exploration    Clarifying  Problem Solving  Confrontation   x Socialization  Limit Setting  Reality Testing   x Activity  Movement  Media    Other:            Response to Learning:  x Able to verbalize current knowledge/experience   x Able to verbalize/acknowledge new learning    Able to retain information    Capable of insight   x Able to change behavior    Progressing to goal    Other:        Comments:

## 2017-12-20 NOTE — PROGRESS NOTES
Psychoeducation Group Note    Date: 12/20/17  Start Time: 1340  End Time: 1410    Number Participants in Group:  9/17    Goal:  Patient will demonstrate increased interpersonal interaction   Topic: Healthy boundaries     Discipline Responsible:   OT  AT  [facility]. x RT  Other       Participation Level:     None  Minimal   x Active Listener x Interactive    Monopolizing         Participation Quality:  x Appropriate  Inappropriate   x       Attentive        Intrusive          Sharing        Resistant          Supportive        Lethargic       Affective:   x Congruent  Incongruent  Blunted  Flat    Constricted  Anxious  Elated  Angry    Labile  Depressed  Other         Cognitive:  x Alert x Oriented PPTP     Concentration x G  F  P   Attention Span x G  F  P   Short-Term Memory  G  F  P   Long-Term Memory  G  F  P   ProblemSolving/  Decision Making x G  F  P   Ability to Process  Information x G  F  P      Contributing Factors             Delusional             Hallucinating             Flight of Ideas             Other:       Modes of Intervention:  x Education  Support x Exploration    Clarifying x Problem Solving  Confrontation   x Socialization x Limit Setting  Reality Testing   x Activity  Movement  Media    Other:            Response to Learning:  x Able to verbalize current knowledge/experience   x Able to verbalize/acknowledge new learning    Able to retain information   x Capable of insight   x Able to change behavior   x Progressing to goal    Other:        Comments:

## 2017-12-20 NOTE — BH NOTE
Date:  12/17/17 Start Time: 830pm  End Time: 915pm    Number Participants in Group:  15    Goal:  Patient will demonstrate increased interpersonal interaction   Topic: Relaxation, wrap up    Discipline Responsible:   OT  AT   x Nsg.  RT  Other       Participation Level:     None  Minimal   x Active Listener x Interactive    Monopolizing         Participation Quality:  x Appropriate  Inappropriate   x       Attentive        Intrusive   x       Sharing        Resistant   x       Supportive        Lethargic       Affective:   x Congruent  Incongruent  Blunted  Flat    Constricted  Anxious  Elated  Angry    Labile  Depressed  Other         Cognitive:  x Alert x Oriented PPTP     Concentration x G  F  P   Attention Span x G  F  P   Short-Term Memory x G  F  P   Long-Term Memory x G  F  P   ProblemSolving/  Decision Making x G  F  P   Ability to Process  Information x G  F  P      Contributing Factors             Delusional             Hallucinating             Flight of Ideas             Other:       Modes of Intervention:  x Education  Support  Exploration   x Clarifying  Problem Solving  Confrontation    Socialization  Limit Setting  Reality Testing    Activity  Movement  Media    Other:            Response to Learning:  x Able to verbalize current knowledge/experience   x Able to verbalize/acknowledge new learning    Able to retain information    Capable of insight    Able to change behavior    Progressing to goal    Other:        Comments:
LPN has reviewed documentation completed by T
PSYCHOEDUCATION GROUP NOTE       Date:    12/17/17              Start Time:    11am          End Time: 1145am      Number Participants in Group: 13/20      Name of group: 10 ways to feel better about yourself. RT  SW  Nsg  LPN  x BHTII  Other       Participation Level:     None  Minimal   x Active Listener  Interactive    Monopolizing         Participation Quality:   Appropriate  Inappropriate     Attentive   Intrusive     Sharing   Resistant     Supportive    Lethargic       Affective:    Congruent  Incongruent  Blunted  Flat    Constricted  Anxious  Elated  Angry    Labile  Depressed  Other         Cognitive:  x Alert  Oriented PPTS     Concentration G x F  P    Attention Span G x F  P    Short-Term Memory G x F  P    Long-Term Memory G x F  P    ProblemSolving/  Decision Making G x F  P    Ability to Process  Information G x F  P       Contributing Factors             Delusional             Hallucinating             Flight of Ideas             Other: poor concentration       Modes of Intervention:   Education  Support  Exploration    Clarifying  Problem Solving  Confrontation    Socialization  Limit Setting  Reality Testing    Activity  Movement  Media    Other:          Response to Learning:   Able to verbalize current knowledge/experience    Able to verbalize/acknowledge new learning    Able to retain information    Capable of insight    Able to change behavior    Progressing to goal    Other:        Comments: Lazaro Ferguson attended am group today.
Psychoeducation Group Note     Date: 12/16/2017            Start Time: 4:00pm                 End Time: 4:30pm     Number Participants in Group:  14     Goal:  Patient will demonstrate knowledge about triggers and how to cope with them.   Topic: Triggers and how to cope.      Discipline Responsible:    OT   AT     X Nsg.  RT   Other         Participation Level:                   None   Minimal   X Active Listener X Interactive     Monopolizing             Participation Quality:  X Appropriate   Inappropriate   X       Attentive         Intrusive    X       Sharing         Resistant    X       Supportive         Lethargic         Affective:           Congruent   Incongruent   Blunted   Flat     Constricted  x Anxious   Elated   Angry     Labile   Depressed   Other             Cognitive:  X Alert  Oriented PPTP      Concentration X G   F   P   Attention Span x G   F   P   Short-Term Memory  X G   F   P   Long-Term Memory  X G   F   P   ProblemSolving/  Decision Making X G   F   P   Ability to Process  Information x G   F   P        Contributing Factors              Delusional              Hallucinating              Flight of Ideas              Other:         Modes of Intervention:  X Education x Support  Exploration     Clarifying  x Problem Solving   Confrontation   X Socialization   Limit Setting   Reality Testing    Activity   Movement   Media     Other:                  Response to Learning:  X Able to verbalize current knowledge/experience    X Able to verbalize/acknowledge new learning   X Able to retain information   X Capable of insight    X Able to change behavior    X Progressing to goal     Other:          Comments:                
Writer has reviewed [facility]'s charting at this time.
Writer has reviewed [facility]'s charting at this time.
envelope, number: [id number]. Patient's home medications were placed in safe. Patient oriented to surroundings and program expectations and copy of patient rights given. Received admission packet. Consents reviewed, signed. Patient verbalize understanding.                    Basil Bone RN

## 2017-12-20 NOTE — PLAN OF CARE
Problem: Altered Mood, Depressive Behavior  Goal: LTG-Able to verbalize and/or display a decrease in depressive symptoms  Outcome: Completed Date Met: 12/20/17  Pt denies dep.   Goal: STG-Able to verbalize suicidal ideations  Outcome: Completed Date Met: 12/20/17  Pt denies SI.
Problem: Altered Mood, Depressive Behavior  Goal: LTG-Able to verbalize and/or display a decrease in depressive symptoms  Outcome: Ongoing    Goal: STG-Able to verbalize suicidal ideations  Outcome: Ongoing  Pt is calm, controlled upon approach, cooperative with treatment. Pt attends groups, is social and pleasant within the milieu. Pt is med compliant, completes ADLs without prompting, and reports good sleep patterns as well as a good appetite. Denies SI, HI, AVH at this time and reports readiness for discharge. Q15min checks maintained for safety.
Problem: Altered Mood, Depressive Behavior  Goal: LTG-Able to verbalize and/or display a decrease in depressive symptoms  Outcome: Ongoing    Goal: STG-Able to verbalize suicidal ideations  Outcome: Ongoing  Pt reports fatigue d/t new medication dosage. Otherwise, pt is calm, controlled upon approach, cooperative with treatment. Pt attends groups, is social and pleasant within the milieu. Pt is med compliant, completes ADLs without prompting, and reports improved sleep patterns as well as a good appetite. Denies SI, HI, AVH at this time. Q15min checks maintained for safety.
Problem: Altered Mood, Depressive Behavior  Goal: LTG-Able to verbalize and/or display a decrease in depressive symptoms  Outcome: Ongoing  PSYCHOEDUCATION GROUP NOTE    Date: 12/14/17  Start Time: 1100  End Time: 1145    Number Participants in Group:  9    Goal:  Patient will demonstrate increased interpersonal interaction   Topic: Memory and processing    Discipline Responsible:   OT  AT  [facility]. x RT MHP Other       Participation Level:     None  Minimal    Active Listener x Interactive    Monopolizing         Participation Quality:  x Appropriate  Inappropriate   x       Attentive        Intrusive   x       Sharing        Resistant          Supportive        Lethargic       Affective:    Congruent  Incongruent  Blunted  Flat   x Constricted  Anxious  Elated  Angry    Labile  Depressed  Other         Cognitive:  x Alert x Oriented PPTP     Concentration  G x F  P   Attention Span x G  F  P   Short-Term Memory x G  F  P   Long-Term Memory  G x F  P   ProblemSolving/  Decision Making  G x F  P   Ability to Process  Information x G  F  P      Contributing Factors             Delusional             Hallucinating             Flight of Ideas             Other:       Modes of Intervention:  x Education x Support  Exploration    Clarifying x Problem Solving  Confrontation   x Socialization  Limit Setting  Reality Testing   x Activity  Movement  Media    Other:            Response to Learning:  x Able to verbalize current knowledge/experience   x Able to verbalize/acknowledge new learning   x Able to retain information   x Capable of insight   x Able to change behavior   x Progressing to goal    Other:        Comments: Pt attended group and participated.
Problem: Altered Mood, Depressive Behavior  Goal: LTG-Able to verbalize and/or display a decrease in depressive symptoms  Outcome: Ongoing  PSYCHOEDUCATION GROUP NOTE    Date: 12/14/17  Start Time: 1435  End Time: 1530    Number Participants in Group:  8    Goal:  Patient will demonstrate increased interpersonal interaction   Topic: Creative expression    Discipline Responsible:   OT  AT  [facility]. x RT MHP Other       Participation Level:     None  Minimal    Active Listener x Interactive    Monopolizing         Participation Quality:  x Appropriate  Inappropriate   x       Attentive        Intrusive   x       Sharing        Resistant          Supportive        Lethargic       Affective:    Congruent  Incongruent  Blunted  Flat   x Constricted  Anxious  Elated  Angry    Labile  Depressed  Other         Cognitive:  x Alert x Oriented PPTP     Concentration  G x F  P   Attention Span  G x F  P   Short-Term Memory  G x F  P   Long-Term Memory  G x F  P   ProblemSolving/  Decision Making  G x F  P   Ability to Process  Information  G x F  P      Contributing Factors             Delusional             Hallucinating             Flight of Ideas             Other:       Modes of Intervention:  x Education x Support x Exploration    Clarifying  Problem Solving  Confrontation   x Socialization  Limit Setting  Reality Testing   x Activity  Movement x Media    Other:            Response to Learning:  x Able to verbalize current knowledge/experience   x Able to verbalize/acknowledge new learning   x Able to retain information   x Capable of insight   x Able to change behavior   x Progressing to goal    Other:        Comments: Pt attended group and participated.
Problem: Altered Mood, Depressive Behavior  Goal: LTG-Able to verbalize and/or display a decrease in depressive symptoms  Outcome: Ongoing  PSYCHOEDUCATION GROUP NOTE    Date: 12/15/17  Start Time: 0920  End Time: 0940    Number Participants in Group:  14    Goal:  Patient will demonstrate increased interpersonal interaction   Topic: Community meeting and goal setting    Discipline Responsible:   OT  AT  [facility]. x RT MHP Other       Participation Level:     None  Minimal    Active Listener x Interactive    Monopolizing         Participation Quality:  x Appropriate  Inappropriate   x       Attentive        Intrusive   x       Sharing        Resistant          Supportive        Lethargic       Affective:   x Congruent  Incongruent  Blunted  Flat    Constricted  Anxious  Elated  Angry    Labile  Depressed  Other         Cognitive:  x Alert x Oriented PPTP     Concentration x G  F  P   Attention Span x G  F  P   Short-Term Memory x G  F  P   Long-Term Memory x G  F  P   ProblemSolving/  Decision Making x G  F  P   Ability to Process  Information x G  F  P      Contributing Factors             Delusional             Hallucinating             Flight of Ideas             Other:       Modes of Intervention:  x Education x Support x Exploration    Clarifying  Problem Solving  Confrontation   x Socialization x Limit Setting  Reality Testing   x Activity  Movement  Media    Other:            Response to Learning:  x Able to verbalize current knowledge/experience   x Able to verbalize/acknowledge new learning   x Able to retain information   x Capable of insight   x Able to change behavior   x Progressing to goal    Other:        Comments: Pt attended group and participated.
Problem: Altered Mood, Depressive Behavior  Goal: LTG-Able to verbalize and/or display a decrease in depressive symptoms  Outcome: Ongoing  PSYCHOEDUCATION GROUP NOTE    Date: 12/15/17  Start Time: 1345  End Time: 1420    Number Participants in Group:  12    Goal:  Patient will demonstrate increased interpersonal interaction   Topic: Patient Advisory     Discipline Responsible:   OT  AT  [facility]. x RT MHP Other       Participation Level:     None  Minimal    Active Listener x Interactive    Monopolizing         Participation Quality:  x Appropriate  Inappropriate   x       Attentive        Intrusive          Sharing        Resistant          Supportive        Lethargic       Affective:   x Congruent  Incongruent  Blunted  Flat    Constricted  Anxious  Elated  Angry    Labile  Depressed  Other         Cognitive:  x Alert x Oriented PPTP     Concentration  G x F  P   Attention Span x G  F  P   Short-Term Memory x G  F  P   Long-Term Memory  G x F  P   ProblemSolving/  Decision Making  G x F  P   Ability to Process  Information x G  F  P      Contributing Factors             Delusional             Hallucinating             Flight of Ideas             Other:       Modes of Intervention:  x Education x Support x Exploration    Clarifying  Problem Solving x Confrontation   x Socialization  Limit Setting x Reality Testing   x Activity  Movement  Media    Other:            Response to Learning:  x Able to verbalize current knowledge/experience   x Able to verbalize/acknowledge new learning   x Able to retain information   x Capable of insight   x Able to change behavior   x Progressing to goal    Other:        Comments: Pt attended group and participated.
Problem: Altered Mood, Depressive Behavior  Goal: LTG-Able to verbalize and/or display a decrease in depressive symptoms  Outcome: Ongoing  PSYCHOEDUCATION GROUP NOTE    Date: 12/18/17  Start Time: 0900  End Time: 0930    Number Participants in Group:  12    Goal:  Patient will demonstrate increased interpersonal interaction   Topic: Community meeting and goal setting    Discipline Responsible:   OT  AT  [facility]. x RT MHP Other       Participation Level:     None  Minimal    Active Listener x Interactive    Monopolizing         Participation Quality:  x Appropriate  Inappropriate   x       Attentive        Intrusive   x       Sharing        Resistant          Supportive        Lethargic       Affective:    Congruent  Incongruent  Blunted  Flat   x Constricted  Anxious  Elated  Angry    Labile  Depressed  Other         Cognitive:  x Alert x Oriented PPTP     Concentration  G x F  P   Attention Span  G x F  P   Short-Term Memory x G  F  P   Long-Term Memory  G x F  P   ProblemSolving/  Decision Making  G x F  P   Ability to Process  Information x G  F  P      Contributing Factors             Delusional             Hallucinating             Flight of Ideas             Other:       Modes of Intervention:  x Education x Support x Exploration    Clarifying  Problem Solving  Confrontation   x Socialization x Limit Setting  Reality Testing   x Activity  Movement  Media    Other:            Response to Learning:  x Able to verbalize current knowledge/experience   x Able to verbalize/acknowledge new learning   x Able to retain information   x Capable of insight   x Able to change behavior   x Progressing to goal    Other:        Comments: Pt attended group and participated appropriately.
Problem: Altered Mood, Depressive Behavior  Goal: LTG-Able to verbalize and/or display a decrease in depressive symptoms  Outcome: Ongoing  PSYCHOEDUCATION GROUP NOTE    Date: 12/19/17  Start Time: 1100  End Time: 1150    Number Participants in Group:  11    Goal:  Patient will demonstrate increased interpersonal interaction   Topic: socialization and communication    Discipline Responsible:   OT  AT  [facility]. x RT MHP Other       Participation Level:     None  Minimal    Active Listener x Interactive    Monopolizing         Participation Quality:  x Appropriate  Inappropriate   x       Attentive        Intrusive   x       Sharing        Resistant          Supportive        Lethargic       Affective:   x Congruent  Incongruent  Blunted  Flat    Constricted  Anxious  Elated  Angry    Labile  Depressed  Other         Cognitive:  x Alert x Oriented PPTP     Concentration x G  F  P   Attention Span x G  F  P   Short-Term Memory x G  F  P   Long-Term Memory x G  F  P   ProblemSolving/  Decision Making x G  F  P   Ability to Process  Information x G  F  P      Contributing Factors             Delusional             Hallucinating             Flight of Ideas             Other:       Modes of Intervention:  x Education x Support x Exploration    Clarifying  Problem Solving  Confrontation   x Socialization  Limit Setting  Reality Testing   x Activity  Movement  Media    Other:            Response to Learning:  x Able to verbalize current knowledge/experience   x Able to verbalize/acknowledge new learning   x Able to retain information   x Capable of insight   x Able to change behavior   x Progressing to goal    Other:        Comments: Pt attended group and participated.
Problem: Altered Mood, Depressive Behavior  Goal: LTG-Able to verbalize and/or display a decrease in depressive symptoms  Outcome: Ongoing  Patient admits to feeling depression at this time, but states she is feeling better since admission. Patient is medication compliant and out and social with peers. Goal: STG-Able to verbalize suicidal ideations  Outcome: Ongoing  Patient denies SI/HI/AH at this time and agrees to approach staff if having any thoughts to harm self or others. Q 15 min safety checks continue at this time.
Problem: Altered Mood, Depressive Behavior  Goal: LTG-Able to verbalize and/or display a decrease in depressive symptoms  Outcome: Ongoing  Patient is out on the unit throughout the day, social with peers and staff, affect is bright upon approach, stated her appetite is good but that she did not sleep well last night. Patient took all medications as prescribed and has attended all groups so far today. Patient is cooperative with care, open during talk time with staff, and stated she feels that she will be ready for discharge as soon as she is sleeping better. Patient denies hallucinations as well as any thoughts to harm herself or others but does admit to minimal depression and anxiety. Goal: STG-Able to verbalize suicidal ideations  Outcome: Ongoing  Patient denies any thoughts to harm self.
Problem: Altered Mood, Depressive Behavior  Goal: LTG-Able to verbalize and/or display a decrease in depressive symptoms  Outcome: Ongoing  Pt admits to depression and anxiety. Pt was seclusive to room and only out for needs. Goal: STG-Able to verbalize suicidal ideations  Outcome: Ongoing  Pt denies suicidal ideation at this time.
Problem: Altered Mood, Depressive Behavior  Goal: LTG-Able to verbalize and/or display a decrease in depressive symptoms  Outcome: Ongoing  Pt is calm, controlled upon approach, cooperative with treatment. Pt attends groups, is social and pleasant with select peers. Pt is med compliant, completes ADLs without prompting, and reports good sleep patterns as well as a good appetite. Denies SI, HI, AVH at this time. Reports readiness for discharge. Q15min checks maintained for safety. Goal: STG-Able to verbalize suicidal ideations  Outcome: Ongoing  Pt denies thoughts of self harm and is agreeable to seeking out should thoughts of self harm arise. Safe environment maintained. Q15 minute checks for safety cont per unit policy. Will cont to monitor for safety and provides support and reassurance as needed.
Problem: Altered Mood, Depressive Behavior  Goal: LTG-Able to verbalize and/or display a decrease in depressive symptoms  Outcome: Ongoing  Pt is calm, controlled upon approach, cooperative with treatment. Pt attends select groups but isolates to her room for long periods of time. Pt is med compliant, completes ADLs without prompting, and reports good sleep patterns as well as a good appetite. Pt admits to depression and anxiety but denies SI, HI, AVH at this time. Q15min checks maintained for safety.
Problem: Altered Mood, Depressive Behavior  Goal: LTG-Able to verbalize and/or display a decrease in depressive symptoms  Outcome: Ongoing  Pt.admits anxiety, sad, +groups. Pt.delusional, in the dayroom social with peers. Q 15 min checks maintained for safety.
Problem: Altered Mood, Depressive Behavior  Goal: LTG-Able to verbalize and/or display a decrease in depressive symptoms  Patient did not attend skills group which focused on learning how to budget from 12/19/17 despite staff encouragement and prompts.
Problem: Altered Mood, Depressive Behavior  Goal: LTG-Able to verbalize and/or display a decrease in depressive symptoms  Patient did not attend trivia skills group from [id number] despite staff encouragement and prompts.
Problem: Altered Mood, Depressive Behavior  Goal: LTG-Able to verbalize and/or display a decrease in depressive symptoms  Psychoeducation Group Note    Date: 12/15/17 Start Time: 1435  End Time: 1520    Number Participants in Group:  8/22    Goal:  Patient will demonstrate increased interpersonal interaction   Topic: Creative holiday express    Discipline Responsible:   OT  AT  [facility]. x RT  Other       Participation Level:     None  Minimal   x Active Listener x Interactive    Monopolizing         Participation Quality:  x Appropriate  Inappropriate   x       Attentive        Intrusive          Sharing        Resistant          Supportive        Lethargic       Affective:   x Congruent  Incongruent  Blunted  Flat    Constricted  Anxious  Elated  Angry    Labile  Depressed  Other         Cognitive:  x Alert x Oriented PPTP     Concentration x G  F  P   Attention Span x G  F  P   Short-Term Memory  G  F  P   Long-Term Memory  G  F  P   ProblemSolving/  Decision Making x G  F  P   Ability to Process  Information x G  F  P      Contributing Factors             Delusional             Hallucinating             Flight of Ideas             Other:       Modes of Intervention:  x Education x Support x Exploration    Clarifying x Problem Solving  Confrontation   x Socialization  Limit Setting  Reality Testing   x Activity  Movement  Media    Other:            Response to Learning:  x Able to verbalize current knowledge/experience   x Able to verbalize/acknowledge new learning    Able to retain information   x Capable of insight    Able to change behavior    Progressing to goal    Other:        Comments:
Problem: Altered Mood, Depressive Behavior  Goal: LTG-Able to verbalize and/or display a decrease in depressive symptoms  Psychoeducation Group Note    Date: 12/16/17  Start Time: 0900  End Time: 0920    Number Participants in Group:  11/20    Goal:  Patient will demonstrate increased interpersonal interaction   Topic: [employer] and goal setting     Discipline Responsible:   OT  AT  [facility]. x RT  Other       Participation Level:     None  Minimal   x Active Listener x Interactive    Monopolizing         Participation Quality:  x Appropriate  Inappropriate   x       Attentive        Intrusive          Sharing        Resistant          Supportive        Lethargic       Affective:   x Congruent  Incongruent  Blunted  Flat    Constricted  Anxious  Elated  Angry    Labile  Depressed  Other         Cognitive:  x Alert x Oriented PPTP     Concentration x G  F  P   Attention Span x G  F  P   Short-Term Memory  G  F  P   Long-Term Memory  G  F  P   ProblemSolving/  Decision Making x G  F  P   Ability to Process  Information x G  F  P      Contributing Factors             Delusional             Hallucinating             Flight of Ideas             Other:       Modes of Intervention:  x Education x Support x Exploration    Clarifying  Problem Solving  Confrontation   x Socialization  Limit Setting  Reality Testing   x Activity  Movement  Media    Other:            Response to Learning:  x Able to verbalize current knowledge/experience    Able to verbalize/acknowledge new learning   x Able to retain information   x Capable of insight    Able to change behavior    Progressing to goal    Other:        Comments:
Problem: Altered Mood, Depressive Behavior  Goal: STG-Able to verbalize suicidal ideations  Psychoeducation Group Note    Date: 12/14/17  Start Time: 1000  End Time: 1045    Number Participants in Group:  8/22    Goal:  Patient will demonstrate increased interpersonal interaction   Topic: PRIORITIZING LIFE CHOICES       Discipline Responsible:   OT  AT  [facility]. X RT  Other       Participation Level:     None  Minimal   X Active Listener X Interactive    Monopolizing         Participation Quality:  X Appropriate  Inappropriate   X       Attentive        Intrusive          Sharing        Resistant          Supportive        Lethargic       Affective:   X Congruent  Incongruent  Blunted  Flat    Constricted  Anxious  Elated  Angry    Labile  Depressed  Other         Cognitive:  X Alert X Oriented PPTP     Concentration X G  F  P   Attention Span X G  F  P   Short-Term Memory  G  F  P   Long-Term Memory  G  F  P   ProblemSolving/  Decision Making X G  F  P   Ability to Process  Information X G  F  P      Contributing Factors             Delusional             Hallucinating             Flight of Ideas             Other:       Modes of Intervention:  X Education  Support  Exploration   X Clarifying  Problem Solving  Confrontation    Socialization  Limit Setting  Reality Testing    Activity  Movement  Media    Other:            Response to Learning:  X Able to verbalize current knowledge/experience   X Able to verbalize/acknowledge new learning    Able to retain information    Capable of insight   X Able to change behavior    Progressing to goal    Other:        Comments:
Problem: Altered Mood, Depressive Behavior  Intervention: Group therapy participation per treatment plan to increase activity level  PSYCHOEDUCATION GROUP NOTE     12/16/17              Start Time:     1000                    End Time: 8797      Number Participants in Group: 14/20  Topic/Discussion: Stinking thinking  Goals: changing negative thinking to positive        RT  SW  Nsg  LPN   BHTII  XX LPC       Participation Level:     None  Minimal   X Active Listener X Interactive    Monopolizing         Participation Quality:  x Appropriate  Inappropriate   x  Attentive   Intrusive   x  Sharing   Resistant   x  Supportive    Lethargic       Affective:   x Congruent  Incongruent  Blunted  Flat    Constricted  Anxious  Elated  Angry    Labile  Depressed  Other         Cognitive:  x Alert x Oriented PPTS     Concentration G x F  P    Attention Span G x F  P    Short-Term Memory G x F  P    Long-Term Memory G x F  P    ProblemSolving/  Decision Making G x F  P    Ability to Process  Information G x F  P       Contributing Factors             Delusional             Hallucinating             Flight of Ideas             Other: poor concentration       Modes of Intervention:  x Education x Support  Exploration   x Clarifying x Problem Solving  Confrontation   x Socialization  Limit Setting  Reality Testing   x Activity  Movement  Media    Other:          Response to Learning:  x Able to verbalize current knowledge/experience   x Able to verbalize/acknowledge new learning   x Able to retain information   x Capable of insight   x Able to change behavior   x Progressing to goal    Other:        Comments: participated well in group
Problem: Altered Mood, Depressive Behavior  Intervention: Group therapy participation per treatment plan to increase activity level  PSYCHOTHERAPY GROUP NOTE    12/15/17               Start Time:    1000                    End Time: 1045      Number Participants in Group: 10/22      Goals: relationships and recovery         RT  SW  Nsg  LPN   BHTII  XX LPC       Participation Level:     None  Minimal   X Active Listener X Interactive    Monopolizing         Participation Quality:  x Appropriate  Inappropriate   x  Attentive   Intrusive   x  Sharing   Resistant   x  Supportive    Lethargic       Affective:    Congruent  Incongruent  Blunted  Flat   x Constricted X Anxious  Elated  Angry    Labile  Depressed  Other         Cognitive:  x Alert x Oriented PPTS     Concentration G x F  P    Attention Span G x F  P    Short-Term Memory G x F  P    Long-Term Memory G x F  P    Problem Solving/  Decision Making G x F  P    Ability to Process  Information G x F  P       Contributing Factors             Delusional             Hallucinating             Flight of Ideas             Other: poor concentration       Modes of Intervention:   Education x Support x Exploration   x Clarifying x Problem Solving  Confrontation    Socialization  Limit Setting  Reality Testing    Activity  Movement  Media    Other:          Response to Learning:  x Able to verbalize current knowledge/experience   x Able to verbalize/acknowledge new learning   x Able to retain information   x Capable of insight   x Able to change behavior   x Progressing to goal    Other:        Comments:  participated in group
Problem: Altered Mood, Depressive Behavior  Intervention: Group therapy participation per treatment plan to increase activity level  PSYCHOTHERAPY GROUP NOTE    12/18/17               Start Time:    1000                    End Time: 1343      Number Participants in Group: 11/22      Goals: relationships and recovery         RT  SW  Nsg  LPN   BHTII  XX LPC       Participation Level:     None  Minimal   x Active Listener  Interactive    Monopolizing         Participation Quality:  x Appropriate x Inappropriate   x  Attentive x  Intrusive   x  Sharing   Resistant     Supportive    Lethargic       Affective:    Congruent  Incongruent  Blunted  Flat    Constricted x Anxious  Elated  Angry    Labile  Depressed  Other         Cognitive:  x Alert x Oriented PPTS     Concentration G  F x P    Attention Span G  F x P    Short-Term Memory G  F x P    Long-Term Memory G  F x P    Problem Solving/  Decision Making G  F x P    Ability to Process  Information G  F x P       Contributing Factors             Delusional             Hallucinating             Flight of Ideas             Other: poor concentration       Modes of Intervention:   Education x Support x Exploration   x Clarifying x Problem Solving  Confrontation    Socialization  Limit Setting  Reality Testing    Activity  Movement  Media    Other:          Response to Learning:  x Able to verbalize current knowledge/experience   x Able to verbalize/acknowledge new learning   x Able to retain information   x Capable of insight    Able to change behavior   x Progressing to goal    Other: intrusive and monopolizing       Comments:  participated in group
Problem: Altered Mood, Depressive Behavior  Intervention: Group therapy participation per treatment plan to increase activity level  PSYCHOTHERAPY GROUP NOTE    12/20/17               Start Time:    1000                    End Time: 1045      Number Participants in Group: 12/19      Goals: relationships and recovery         RT  SW  Nsg  LPN   BHTII  XX LPC       Participation Level:     None  Minimal   x Active Listener  Interactive    Monopolizing         Participation Quality:  x Appropriate  Inappropriate   x  Attentive   Intrusive   x  Sharing   Resistant     Supportive    Lethargic       Affective:    Congruent  Incongruent  Blunted  Flat    Constricted x Anxious  Elated  Angry    Labile  Depressed  Other         Cognitive:  x Alert x Oriented PPTS     Concentration G  F x P    Attention Span G  F x P    Short-Term Memory G  F x P    Long-Term Memory G  F x P    Problem Solving/  Decision Making G  F x P    Ability to Process  Information G  F x P       Contributing Factors             Delusional             Hallucinating             Flight of Ideas             Other: poor concentration       Modes of Intervention:   Education x Support x Exploration   x Clarifying x Problem Solving  Confrontation    Socialization  Limit Setting  Reality Testing    Activity  Movement  Media    Other:          Response to Learning:  x Able to verbalize current knowledge/experience   x Able to verbalize/acknowledge new learning   x Able to retain information   x Capable of insight    Able to change behavior   x Progressing to goal    Other: intrusive and monopolizing       Comments:  participated in group
Preoccupations  Hallucinations: None  Delusions: No  Memory:Normal: Yes  Insight and Judgment: No  Insight and Judgment: Poor Judgment, Poor Insight  Present Suicidal Ideation: No  Present Homicidal Ideation: No    Daily Assessment Last Entry:   Daily Sleep (WDL): Exceptions to WDL         Patient Currently in Pain: Denies  Daily Nutrition (WDL): Within Defined Limits    Patient Monitoring:  Frequency of Checks: 4 times per hour, close    Psychiatric Symptoms:   Depression Symptoms  Depression Symptoms: Impaired concentration  Anxiety Symptoms  Anxiety Symptoms: Generalized  Cielo Symptoms  Cielo Symptoms: No problems reported or observed. Psychosis Symptoms  Delusion Type: No problems reported or observed. Suicide Risk CSSR-S:  1) Within the past month, have you wished you were dead or wished you could go to sleep and not wake up? : YES  2) Within the past month, have you actually had any thoughts of killing yourself? : YES  3) Within the past month, have you been thinking about how you might kill yourself? : YES  5) Within the past month, have you started to work out or worked out the details of how to kill yourself?  Do you intend to carry out this plan? : NO  6)  Have you ever done anything, started to do anything, or prepared to do anything to end your life?: YES  Change in Result no  Change in Plan of care no       EDUCATION:   Learner Progress Toward Treatment Goals: Reviewed results and recommendations of this team    Method: Small group    Outcome: Verbalized understanding    PATIENT GOALS: to keep positive set limits with family stay sober and on meds follow up with [facility]    PLAN/TREATMENT RECOMMENDATIONS UPDATE: Follow all treatment team recommendations and attend all groups    SHORT-TERM GOALS UPDATE:   Time frame for Short-Term Goals: 5-7 days    LONG-TERM GOALS UPDATE:   Time frame for Long-Term Goals: 6 months   Members Present in Team Meeting: See Signature Sheet    Juancarlos Acuña LPC
reinforcement    PATIENT GOALS: to be discussed with patient within 72 hours    PLAN/TREATMENT RECOMMENDATIONS:     continue group therapy , medications compliance, goal setting, individualized assessments and care, continue to monitor pt on unit      SHORT-TERM GOALS:   Time frame for Short-Term Goals: 5-7 days    LONG-TERM GOALS:  Time frame for Long-Term Goals: 6 months  Members Present in Team Meeting: See Signature Sheet    Gordy Caba, [id number]

## 2017-12-20 NOTE — PROGRESS NOTES
PSYCHOEDUCATION GROUP NOTE    Date: 12/20/17  Start Time: 0900  End Time: 0930    Number Participants in Group:  12    Goal:  Patient will demonstrate increased interpersonal interaction   Topic: Community meeting and goal setting    Discipline Responsible:   OT  AT  [facility]. x RT [facility]HP Other       Participation Level:     None  Minimal    Active Listener x Interactive    Monopolizing         Participation Quality:  x Appropriate  Inappropriate   x       Attentive        Intrusive   x       Sharing        Resistant          Supportive        Lethargic       Affective:    Congruent  Incongruent  Blunted  Flat   x Constricted  Anxious  Elated  Angry    Labile  Depressed  Other         Cognitive:  x Alert x Oriented PPTP     Concentration x G  F  P   Attention Span x G  F  P   Short-Term Memory x G  F  P   Long-Term Memory x G  F  P   ProblemSolving/  Decision Making x G  F  P   Ability to Process  Information x G  F  P      Contributing Factors             Delusional             Hallucinating             Flight of Ideas             Other:       Modes of Intervention:  x Education x Support x Exploration    Clarifying  Problem Solving  Confrontation   x Socialization x Limit Setting  Reality Testing   x Activity  Movement  Media    Other:            Response to Learning:  x Able to verbalize current knowledge/experience   x Able to verbalize/acknowledge new learning   x Able to retain information   x Capable of insight   x Able to change behavior   x Progressing to goal    Other:        Comments: Pt attended group and participated.

## 2017-12-21 NOTE — PROGRESS NOTES
[facility]  Discharge Note    Pt discharged with followings belongings:   Dentures: None  Vision - Corrective Lenses: Glasses (glassesx2)  Hearing Aid: None  Jewelry: Ring, Necklace  Body Piercings Removed: N/A  Clothing: Footwear, Jacket / coat, Pants, Shirt, Sweater, Socks, Undergarments (Comment), Pajamas  Were All Patient Medications Collected?: Yes (misc pills and cough drops collected and placed in safe)  Other Valuables: Cell phone, Money (Comment), Wallet, Arline Aditya, Purse, Other (Comment) (ipod; )   Valuables sent home withpatient. Valuables retrieved from safe, Security envelope number:  [id number] and returned to patient. Patient left department with Departure Mode: By self, In cab via Mobility at Departure: Ambulatory, discharged to Discharged to: Private Residence. Patient education on aftercare instructions:   Information Patient verbalize understanding of AVS:  States she feels ready for discharge,denies thoughts of suicide,agrees to keep appointments and take medications as prescribed.     Status EXAM upon discharge:  Status and Exam  Normal: Yes  Facial Expression: Brightened  Affect: Appropriate  Level of Consciousness: Alert  Mood:Normal: Yes  Mood: Depressed, Anxious  Motor Activity:Normal: Yes  Motor Activity: Decreased  Interview Behavior: Cooperative  Preception: Peculiar to Person, Sly Founds to Time, Peculiar to Place, Peculiar to Situation  Attention:Normal: Yes  Attention: Distractible  Thought Processes:  (wdl)  Thought Content:Normal: Yes  Thought Content: Preoccupations  Hallucinations: None  Delusions: No  Delusions: Persecution  Memory:Normal: Yes  Memory: Poor Remote  Insight and Judgment: Yes  Insight and Judgment: Poor Judgment  Present Suicidal Ideation: No  Present Homicidal Ideation: No    Bruce Mejía RN

## 2017-12-23 NOTE — DISCHARGE SUMMARY
This is a 60-year-old white female patient who was admitted on 13 December 2017 from medical floor where she had been treated for acute intoxication and was detoxed but she was expressing suicidal feelings as well as intentions as she reports that she has been feeling very depressed and despondent and hopeless overwhelmed or suicidal.  She states that she has been suffering from depression for last more than 20 years and has been compliant with treatment but she continues to have racing thoughts and mood swings and goes back to drinking. 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 She denies any medical problems. She reports that her father as well as grandmother have history of psychiatric problems. She reports that she was born and raised in [place]. She graduated from high school and has a 3 years of college. 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 She was  for 3 years and has been  for 14 years. She doesn't have any children. She lives alone. On mental status examination She is of average height and built, cooperative and informative, talking  slowly in a monotonous tone. Her affect is flat and psychomotor activity is retarded showing poverty of thought and blocking. She  has low self-esteem expressing feelings of helplessness, hopelessness, and suicidal feelings. She  denies any homicidal feelings, delusions, ideas of reference or hallucinations and none were observed during the examination. She  is alert and oriented to time place person and situation. Her  memory for recent as well as remote events seems fair. IntellectuallyShe is average. She  has superficial judgment and partial insight.       Course of hospitalization:Her lab workup including CBC, differential, urinalysis and blood chemistry were done on the medical floor so not repeated.   She was started on mood stabilizing and antidepressant medications and was involved in individual supportive therapy and hospital milieu. She responded well to this treatment regimen and psychomotor activity improved significantly. Thought process was better organized and developed insight into compliance with treatment. Side effects of medications reviewed and medication education provided. Smoking cessation information was provided. She was making rational and realistic plans, not in any distress so discharged as improved. Final Diagnosis : Bipolar disorder    Discharge medications: Please see discharge medication list.      Follow up was scheduled  at [facility].

## 2018-05-01 ENCOUNTER — HOSPITAL ENCOUNTER (EMERGENCY)
Age: 53
Discharge: HOME | End: 2018-05-01
Attending: EMERGENCY MEDICINE
Payer: COMMERCIAL

## 2018-05-01 VITALS
TEMPERATURE: 97.9 F | HEIGHT: 62 IN | HEART RATE: 78 BPM | OXYGEN SATURATION: 100 % | DIASTOLIC BLOOD PRESSURE: 86 MMHG | BODY MASS INDEX: 35.33 KG/M2 | SYSTOLIC BLOOD PRESSURE: 126 MMHG | RESPIRATION RATE: 15 BRPM | WEIGHT: 192 LBS

## 2018-05-01 DIAGNOSIS — A60.00 HERPES SIMPLEX INFECTION OF GENITOURINARY SYSTEM: ICD-10-CM

## 2018-05-01 DIAGNOSIS — R00.2 PALPITATIONS: Primary | ICD-10-CM

## 2018-05-01 LAB
ANION GAP SERPL CALCULATED.3IONS-SCNC: 8 MMOL/L (ref 9–17)
BUN BLDV-MCNC: 18 MG/DL (ref 6–20)
BUN/CREAT BLD: ABNORMAL (ref 9–20)
C-REACTIVE PROTEIN: 1.8 MG/L (ref 0–5)
CALCIUM SERPL-MCNC: 8.4 MG/DL (ref 8.6–10.4)
CHLORIDE BLD-SCNC: 104 MMOL/L (ref 98–107)
CO2: 25 MMOL/L (ref 20–31)
CREAT SERPL-MCNC: 0.46 MG/DL (ref 0.5–0.9)
EKG ATRIAL RATE: 104 BPM
EKG P AXIS: 41 DEGREES
EKG P-R INTERVAL: 166 MS
EKG Q-T INTERVAL: 338 MS
EKG QRS DURATION: 74 MS
EKG QTC CALCULATION (BAZETT): 444 MS
EKG R AXIS: -26 DEGREES
EKG T AXIS: 10 DEGREES
EKG VENTRICULAR RATE: 104 BPM
GFR AFRICAN AMERICAN: >60 ML/MIN
GFR NON-AFRICAN AMERICAN: >60 ML/MIN
GFR SERPL CREATININE-BSD FRML MDRD: ABNORMAL ML/MIN/{1.73_M2}
GFR SERPL CREATININE-BSD FRML MDRD: ABNORMAL ML/MIN/{1.73_M2}
GLUCOSE BLD-MCNC: 93 MG/DL (ref 70–99)
HCT VFR BLD CALC: 42.2 % (ref 36.3–47.1)
HEMOGLOBIN: 13.8 G/DL (ref 11.9–15.1)
MCH RBC QN AUTO: 32.1 PG (ref 25.2–33.5)
MCHC RBC AUTO-ENTMCNC: 32.7 G/DL (ref 28.4–34.8)
MCV RBC AUTO: 98.1 FL (ref 82.6–102.9)
NRBC AUTOMATED: 0 PER 100 WBC
PDW BLD-RTO: 13.9 % (ref 11.8–14.4)
PLATELET # BLD: 153 K/UL (ref 138–453)
PMV BLD AUTO: 9.9 FL (ref 8.1–13.5)
POC TROPONIN I: 0 NG/ML (ref 0–0.1)
POC TROPONIN INTERP: NORMAL
POTASSIUM SERPL-SCNC: 4.3 MMOL/L (ref 3.7–5.3)
RBC # BLD: 4.3 M/UL (ref 3.95–5.11)
SEDIMENTATION RATE, ERYTHROCYTE: 2 MM (ref 0–20)
SODIUM BLD-SCNC: 137 MMOL/L (ref 135–144)
TSH SERPL DL<=0.05 MIU/L-ACNC: 1.47 MIU/L (ref 0.3–5)
WBC # BLD: 6.4 K/UL (ref 3.5–11.3)

## 2018-05-01 PROCEDURE — 99285 EMERGENCY DEPT VISIT HI MDM: CPT

## 2018-05-01 PROCEDURE — 96365 THER/PROPH/DIAG IV INF INIT: CPT

## 2018-05-01 PROCEDURE — 6360000002 HC RX W HCPCS: Performed by: EMERGENCY MEDICINE

## 2018-05-01 PROCEDURE — 85651 RBC SED RATE NONAUTOMATED: CPT

## 2018-05-01 PROCEDURE — 93005 ELECTROCARDIOGRAM TRACING: CPT

## 2018-05-01 PROCEDURE — 84443 ASSAY THYROID STIM HORMONE: CPT

## 2018-05-01 PROCEDURE — 86140 C-REACTIVE PROTEIN: CPT

## 2018-05-01 PROCEDURE — 2580000003 HC RX 258: Performed by: EMERGENCY MEDICINE

## 2018-05-01 PROCEDURE — 84484 ASSAY OF TROPONIN QUANT: CPT

## 2018-05-01 PROCEDURE — 85027 COMPLETE CBC AUTOMATED: CPT

## 2018-05-01 PROCEDURE — 80048 BASIC METABOLIC PNL TOTAL CA: CPT

## 2018-05-01 RX ORDER — 0.9 % SODIUM CHLORIDE 0.9 %
1000 INTRAVENOUS SOLUTION INTRAVENOUS ONCE
Status: COMPLETED | OUTPATIENT
Start: 2018-05-01 | End: 2018-05-01

## 2018-05-01 RX ORDER — MAGNESIUM SULFATE 1 G/100ML
1 INJECTION INTRAVENOUS
Status: DISPENSED | OUTPATIENT
Start: 2018-05-01 | End: 2018-05-01

## 2018-05-01 RX ADMIN — MAGNESIUM SULFATE HEPTAHYDRATE 1 G: 1 INJECTION, SOLUTION INTRAVENOUS at 13:03

## 2018-05-01 RX ADMIN — SODIUM CHLORIDE 1000 ML: 9 INJECTION, SOLUTION INTRAVENOUS at 12:18

## 2018-05-03 ASSESSMENT — ENCOUNTER SYMPTOMS
NAUSEA: 0
BLOOD IN STOOL: 0
DIARRHEA: 0
CONSTIPATION: 0
RHINORRHEA: 0
ABDOMINAL PAIN: 0
CHEST TIGHTNESS: 0
SHORTNESS OF BREATH: 0
SORE THROAT: 0
BACK PAIN: 0
VOMITING: 0

## 2018-05-14 ENCOUNTER — HOSPITAL ENCOUNTER (OUTPATIENT)
Age: 53
Setting detail: SPECIMEN
Discharge: HOME | End: 2018-05-14
Payer: COMMERCIAL

## 2018-05-14 LAB
ALBUMIN SERPL-MCNC: 3.4 G/DL (ref 3.5–5.2)
ALBUMIN/GLOBULIN RATIO: 1 (ref 1–2.5)
ALP BLD-CCNC: 121 U/L (ref 35–104)
ALT SERPL-CCNC: 89 U/L (ref 5–33)
ANION GAP SERPL CALCULATED.3IONS-SCNC: 13 MMOL/L (ref 9–17)
AST SERPL-CCNC: 276 U/L
BILIRUB SERPL-MCNC: 0.61 MG/DL (ref 0.3–1.2)
BUN BLDV-MCNC: 15 MG/DL (ref 6–20)
BUN/CREAT BLD: ABNORMAL (ref 9–20)
CALCIUM SERPL-MCNC: 8.2 MG/DL (ref 8.6–10.4)
CHLORIDE BLD-SCNC: 101 MMOL/L (ref 98–107)
CHOLESTEROL/HDL RATIO: 12
CHOLESTEROL: 265 MG/DL
CO2: 23 MMOL/L (ref 20–31)
CREAT SERPL-MCNC: 0.6 MG/DL (ref 0.5–0.9)
GFR AFRICAN AMERICAN: >60 ML/MIN
GFR NON-AFRICAN AMERICAN: >60 ML/MIN
GFR SERPL CREATININE-BSD FRML MDRD: ABNORMAL ML/MIN/{1.73_M2}
GFR SERPL CREATININE-BSD FRML MDRD: ABNORMAL ML/MIN/{1.73_M2}
GLUCOSE BLD-MCNC: 109 MG/DL (ref 70–99)
HDLC SERPL-MCNC: 22 MG/DL
LDL CHOLESTEROL DIRECT: 14 MG/DL
LDL CHOLESTEROL: ABNORMAL MG/DL (ref 0–130)
POTASSIUM SERPL-SCNC: 4.2 MMOL/L (ref 3.7–5.3)
SODIUM BLD-SCNC: 137 MMOL/L (ref 135–144)
THYROXINE, FREE: 1.23 NG/DL (ref 0.93–1.7)
TOTAL PROTEIN: 6.9 G/DL (ref 6.4–8.3)
TRIGL SERPL-MCNC: 2125 MG/DL
TSH SERPL DL<=0.05 MIU/L-ACNC: 0.99 MIU/L (ref 0.3–5)
VITAMIN D 25-HYDROXY: 44.2 NG/ML (ref 30–100)
VLDLC SERPL CALC-MCNC: ABNORMAL MG/DL (ref 1–30)

## 2018-12-21 ENCOUNTER — HOSPITAL ENCOUNTER (EMERGENCY)
Age: 53
Discharge: LEFT BEFORE DISCHARGE | End: 2018-12-21
Attending: EMERGENCY MEDICINE | Admitting: INTERNAL MEDICINE
Payer: COMMERCIAL

## 2018-12-21 ENCOUNTER — APPOINTMENT (OUTPATIENT)
Dept: CT IMAGING | Age: 53
End: 2018-12-21
Payer: COMMERCIAL

## 2018-12-21 VITALS
BODY MASS INDEX: 35.51 KG/M2 | HEIGHT: 62 IN | RESPIRATION RATE: 15 BRPM | HEART RATE: 96 BPM | DIASTOLIC BLOOD PRESSURE: 92 MMHG | WEIGHT: 193 LBS | OXYGEN SATURATION: 98 % | TEMPERATURE: 98.2 F | SYSTOLIC BLOOD PRESSURE: 133 MMHG

## 2018-12-21 DIAGNOSIS — R17 JAUNDICE: Primary | ICD-10-CM

## 2018-12-21 DIAGNOSIS — E80.6 HYPERBILIRUBINEMIA: ICD-10-CM

## 2018-12-21 LAB
-: ABNORMAL
ABSOLUTE EOS #: 0.09 K/UL (ref 0–0.4)
ABSOLUTE IMMATURE GRANULOCYTE: 0 K/UL (ref 0–0.3)
ABSOLUTE LYMPH #: 1.26 K/UL (ref 1–4.8)
ABSOLUTE MONO #: 0.09 K/UL (ref 0.1–0.8)
ALBUMIN SERPL-MCNC: 2.8 G/DL (ref 3.5–5.2)
ALBUMIN/GLOBULIN RATIO: 0.7 (ref 1–2.5)
ALP BLD-CCNC: 155 U/L (ref 35–104)
ALT SERPL-CCNC: 46 U/L (ref 5–33)
AMORPHOUS: ABNORMAL
AMYLASE: 22 U/L (ref 28–100)
ANION GAP SERPL CALCULATED.3IONS-SCNC: 15 MMOL/L (ref 9–17)
AST SERPL-CCNC: 222 U/L
BACTERIA: ABNORMAL
BASOPHILS # BLD: 0 % (ref 0–2)
BASOPHILS ABSOLUTE: 0 K/UL (ref 0–0.2)
BILIRUB SERPL-MCNC: 24.55 MG/DL (ref 0.3–1.2)
BILIRUBIN DIRECT: 19.2 MG/DL
BILIRUBIN URINE: ABNORMAL
BILIRUBIN, INDIRECT: 5.35 MG/DL (ref 0–1)
BUN BLDV-MCNC: 5 MG/DL (ref 6–20)
BUN/CREAT BLD: ABNORMAL (ref 9–20)
CALCIUM SERPL-MCNC: 8.9 MG/DL (ref 8.6–10.4)
CASTS UA: ABNORMAL /LPF (ref 0–8)
CHLORIDE BLD-SCNC: 102 MMOL/L (ref 98–107)
CO2: 18 MMOL/L (ref 20–31)
COLOR: ABNORMAL
COMMENT UA: ABNORMAL
CREAT SERPL-MCNC: <0.2 MG/DL (ref 0.5–0.9)
CRYSTALS, UA: ABNORMAL /HPF
DIFFERENTIAL TYPE: ABNORMAL
EOSINOPHILS RELATIVE PERCENT: 1 % (ref 1–4)
EPITHELIAL CELLS UA: ABNORMAL /HPF (ref 0–5)
GFR AFRICAN AMERICAN: ABNORMAL ML/MIN
GFR NON-AFRICAN AMERICAN: ABNORMAL ML/MIN
GFR SERPL CREATININE-BSD FRML MDRD: ABNORMAL ML/MIN/{1.73_M2}
GFR SERPL CREATININE-BSD FRML MDRD: ABNORMAL ML/MIN/{1.73_M2}
GLOBULIN: ABNORMAL G/DL (ref 1.5–3.8)
GLUCOSE BLD-MCNC: 109 MG/DL (ref 70–99)
GLUCOSE URINE: NEGATIVE
HCT VFR BLD CALC: 42.6 % (ref 36.3–47.1)
HEMOGLOBIN: 13.5 G/DL (ref 11.9–15.1)
IMMATURE GRANULOCYTES: 0 %
KETONES, URINE: NEGATIVE
LEUKOCYTE ESTERASE, URINE: ABNORMAL
LIPASE: 27 U/L (ref 13–60)
LYMPHOCYTES # BLD: 14 % (ref 24–44)
MCH RBC QN AUTO: 38 PG (ref 25.2–33.5)
MCHC RBC AUTO-ENTMCNC: 31.7 G/DL (ref 28.4–34.8)
MCV RBC AUTO: 120 FL (ref 82.6–102.9)
MONOCYTES # BLD: 1 % (ref 1–7)
MORPHOLOGY: ABNORMAL
MUCUS: ABNORMAL
NITRITE, URINE: POSITIVE
NRBC AUTOMATED: 0 PER 100 WBC
OTHER OBSERVATIONS UA: ABNORMAL
PDW BLD-RTO: 15.3 % (ref 11.8–14.4)
PH UA: 7.5 (ref 5–8)
PLATELET # BLD: 270 K/UL (ref 138–453)
PLATELET ESTIMATE: ABNORMAL
PMV BLD AUTO: 10.3 FL (ref 8.1–13.5)
POTASSIUM SERPL-SCNC: 4.1 MMOL/L (ref 3.7–5.3)
PROTEIN UA: NEGATIVE
RBC # BLD: 3.55 M/UL (ref 3.95–5.11)
RBC # BLD: ABNORMAL 10*6/UL
RBC UA: ABNORMAL /HPF (ref 0–4)
RENAL EPITHELIAL, UA: ABNORMAL /HPF
SEG NEUTROPHILS: 84 % (ref 36–66)
SEGMENTED NEUTROPHILS ABSOLUTE COUNT: 7.56 K/UL (ref 1.8–7.7)
SODIUM BLD-SCNC: 135 MMOL/L (ref 135–144)
SPECIFIC GRAVITY UA: 1.05 (ref 1–1.03)
TOTAL PROTEIN: 7 G/DL (ref 6.4–8.3)
TRICHOMONAS: ABNORMAL
TURBIDITY: ABNORMAL
URINE HGB: ABNORMAL
UROBILINOGEN, URINE: NORMAL
WBC # BLD: 9 K/UL (ref 3.5–11.3)
WBC # BLD: ABNORMAL 10*3/UL
WBC UA: ABNORMAL /HPF (ref 0–5)
YEAST: ABNORMAL

## 2018-12-21 PROCEDURE — 85025 COMPLETE CBC W/AUTO DIFF WBC: CPT

## 2018-12-21 PROCEDURE — 82150 ASSAY OF AMYLASE: CPT

## 2018-12-21 PROCEDURE — 2580000003 HC RX 258: Performed by: EMERGENCY MEDICINE

## 2018-12-21 PROCEDURE — 83690 ASSAY OF LIPASE: CPT

## 2018-12-21 PROCEDURE — 6360000004 HC RX CONTRAST MEDICATION: Performed by: EMERGENCY MEDICINE

## 2018-12-21 PROCEDURE — 96374 THER/PROPH/DIAG INJ IV PUSH: CPT

## 2018-12-21 PROCEDURE — 81001 URINALYSIS AUTO W/SCOPE: CPT

## 2018-12-21 PROCEDURE — 6360000002 HC RX W HCPCS: Performed by: EMERGENCY MEDICINE

## 2018-12-21 PROCEDURE — 80076 HEPATIC FUNCTION PANEL: CPT

## 2018-12-21 PROCEDURE — 99285 EMERGENCY DEPT VISIT HI MDM: CPT

## 2018-12-21 PROCEDURE — 74177 CT ABD & PELVIS W/CONTRAST: CPT

## 2018-12-21 PROCEDURE — 80048 BASIC METABOLIC PNL TOTAL CA: CPT

## 2018-12-21 RX ORDER — SODIUM CHLORIDE 0.9 % (FLUSH) 0.9 %
10 SYRINGE (ML) INJECTION PRN
Status: CANCELLED | OUTPATIENT
Start: 2018-12-21

## 2018-12-21 RX ORDER — ACETAMINOPHEN 325 MG/1
650 TABLET ORAL EVERY 4 HOURS PRN
Status: CANCELLED | OUTPATIENT
Start: 2018-12-21

## 2018-12-21 RX ORDER — 0.9 % SODIUM CHLORIDE 0.9 %
1000 INTRAVENOUS SOLUTION INTRAVENOUS ONCE
Status: COMPLETED | OUTPATIENT
Start: 2018-12-21 | End: 2018-12-21

## 2018-12-21 RX ORDER — ONDANSETRON 2 MG/ML
4 INJECTION INTRAMUSCULAR; INTRAVENOUS ONCE
Status: COMPLETED | OUTPATIENT
Start: 2018-12-21 | End: 2018-12-21

## 2018-12-21 RX ORDER — SODIUM CHLORIDE 0.9 % (FLUSH) 0.9 %
10 SYRINGE (ML) INJECTION EVERY 12 HOURS SCHEDULED
Status: CANCELLED | OUTPATIENT
Start: 2018-12-21

## 2018-12-21 RX ADMIN — IOVERSOL 75 ML: 741 INJECTION INTRA-ARTERIAL; INTRAVENOUS at 16:05

## 2018-12-21 RX ADMIN — SODIUM CHLORIDE 1000 ML: 9 INJECTION, SOLUTION INTRAVENOUS at 14:42

## 2018-12-21 RX ADMIN — ONDANSETRON 4 MG: 2 INJECTION INTRAMUSCULAR; INTRAVENOUS at 14:42

## 2018-12-21 ASSESSMENT — ENCOUNTER SYMPTOMS
PHOTOPHOBIA: 0
CHEST TIGHTNESS: 0
COUGH: 0
NAUSEA: 1
DIARRHEA: 0
SORE THROAT: 0
TROUBLE SWALLOWING: 0
WHEEZING: 0
SHORTNESS OF BREATH: 0
EYE REDNESS: 0
BACK PAIN: 0
VOMITING: 0
BLOOD IN STOOL: 0
ABDOMINAL PAIN: 0

## 2018-12-21 ASSESSMENT — PAIN SCALES - GENERAL: PAINLEVEL_OUTOF10: 4

## 2018-12-21 ASSESSMENT — PAIN DESCRIPTION - LOCATION: LOCATION: ABDOMEN

## 2018-12-21 ASSESSMENT — PAIN DESCRIPTION - PAIN TYPE: TYPE: CHRONIC PAIN

## 2018-12-21 NOTE — ED NOTES
Critical lab received, total bili 24.55, direct bili 19.2  MD notified      Mars Conner, RN  12/21/18 [id number]

## 2018-12-21 NOTE — ED TRIAGE NOTES
Pt presents to ED with steady gait c/o chronic abd pain with nausea also stating she's noticed her jaundice has been more severe in the last 2 weeks. Pt states she's a daily vodka drinker with last drink one week ago. NAD noted, awaiting orders.

## 2018-12-26 ENCOUNTER — HOSPITAL ENCOUNTER (OUTPATIENT)
Age: 53
Discharge: HOME | End: 2018-12-26
Payer: COMMERCIAL

## 2018-12-26 LAB
ABSOLUTE EOS #: 0.09 K/UL (ref 0–0.4)
ABSOLUTE IMMATURE GRANULOCYTE: ABNORMAL K/UL (ref 0–0.3)
ABSOLUTE LYMPH #: 1.53 K/UL (ref 1–4.8)
ABSOLUTE MONO #: 0.6 K/UL (ref 0.1–1.3)
ALBUMIN SERPL-MCNC: 2.9 G/DL (ref 3.5–5.2)
ALBUMIN/GLOBULIN RATIO: ABNORMAL (ref 1–2.5)
ALP BLD-CCNC: 173 U/L (ref 35–104)
ALT SERPL-CCNC: 62 U/L (ref 5–33)
AMYLASE: 24 U/L (ref 28–100)
AST SERPL-CCNC: 215 U/L
BASOPHILS # BLD: 1 % (ref 0–2)
BASOPHILS ABSOLUTE: 0.09 K/UL (ref 0–0.2)
BILIRUB SERPL-MCNC: 25.13 MG/DL (ref 0.3–1.2)
BILIRUBIN DIRECT: 19.68 MG/DL
BILIRUBIN, INDIRECT: 5.45 MG/DL (ref 0–1)
DIFFERENTIAL TYPE: ABNORMAL
EOSINOPHILS RELATIVE PERCENT: 1 % (ref 0–4)
GLOBULIN: ABNORMAL G/DL (ref 1.5–3.8)
HCT VFR BLD CALC: 41.1 % (ref 36–46)
HEMOGLOBIN: 13.7 G/DL (ref 12–16)
IMMATURE GRANULOCYTES: ABNORMAL %
LIPASE: 44 U/L (ref 13–60)
LYMPHOCYTES # BLD: 18 % (ref 24–44)
MCH RBC QN AUTO: 39.2 PG (ref 26–34)
MCHC RBC AUTO-ENTMCNC: 33.2 G/DL (ref 31–37)
MCV RBC AUTO: 118 FL (ref 80–100)
MONOCYTES # BLD: 7 % (ref 1–7)
MORPHOLOGY: ABNORMAL
NRBC AUTOMATED: ABNORMAL PER 100 WBC
PDW BLD-RTO: 15.5 % (ref 11.5–14.9)
PLATELET # BLD: 289 K/UL (ref 150–450)
PLATELET ESTIMATE: ABNORMAL
PMV BLD AUTO: 9.2 FL (ref 6–12)
RBC # BLD: 3.49 M/UL (ref 4–5.2)
RBC # BLD: ABNORMAL 10*6/UL
SEG NEUTROPHILS: 73 % (ref 36–66)
SEGMENTED NEUTROPHILS ABSOLUTE COUNT: 6.19 K/UL (ref 1.3–9.1)
TOTAL PROTEIN: 7 G/DL (ref 6.4–8.3)
WBC # BLD: 8.5 K/UL (ref 3.5–11)
WBC # BLD: ABNORMAL 10*3/UL

## 2018-12-26 PROCEDURE — 80076 HEPATIC FUNCTION PANEL: CPT

## 2018-12-26 PROCEDURE — 85025 COMPLETE CBC W/AUTO DIFF WBC: CPT

## 2018-12-26 PROCEDURE — 83690 ASSAY OF LIPASE: CPT

## 2018-12-26 PROCEDURE — 36415 COLL VENOUS BLD VENIPUNCTURE: CPT

## 2018-12-26 PROCEDURE — 82150 ASSAY OF AMYLASE: CPT

## 2018-12-27 LAB — PATHOLOGIST REVIEW: NORMAL

## 2019-01-02 ENCOUNTER — OFFICE VISIT (OUTPATIENT)
Dept: GASTROENTEROLOGY | Age: 54
End: 2019-01-02

## 2019-01-02 VITALS
WEIGHT: 178.2 LBS | HEART RATE: 116 BPM | HEIGHT: 62 IN | BODY MASS INDEX: 32.79 KG/M2 | DIASTOLIC BLOOD PRESSURE: 85 MMHG | SYSTOLIC BLOOD PRESSURE: 115 MMHG

## 2019-01-02 DIAGNOSIS — R10.84 GENERALIZED ABDOMINAL PAIN: ICD-10-CM

## 2019-01-02 DIAGNOSIS — R14.0 ABDOMINAL BLOATING: ICD-10-CM

## 2019-01-02 DIAGNOSIS — R19.7 DIARRHEA, UNSPECIFIED TYPE: ICD-10-CM

## 2019-01-02 DIAGNOSIS — R17 JAUNDICE: Primary | ICD-10-CM

## 2019-01-02 PROBLEM — R10.9 ABDOMINAL PAIN: Status: ACTIVE | Noted: 2019-01-02

## 2019-01-02 PROCEDURE — G8427 DOCREV CUR MEDS BY ELIG CLIN: HCPCS | Performed by: INTERNAL MEDICINE

## 2019-01-02 PROCEDURE — G8484 FLU IMMUNIZE NO ADMIN: HCPCS | Performed by: INTERNAL MEDICINE

## 2019-01-02 PROCEDURE — G8417 CALC BMI ABV UP PARAM F/U: HCPCS | Performed by: INTERNAL MEDICINE

## 2019-01-02 PROCEDURE — 3017F COLORECTAL CA SCREEN DOC REV: CPT | Performed by: INTERNAL MEDICINE

## 2019-01-02 PROCEDURE — 99245 OFF/OP CONSLTJ NEW/EST HI 55: CPT | Performed by: INTERNAL MEDICINE

## 2019-01-02 RX ORDER — CHOLESTYRAMINE 4 G/9G
1 POWDER, FOR SUSPENSION ORAL 2 TIMES DAILY
Qty: 90 PACKET | Refills: 3 | Status: SHIPPED | OUTPATIENT
Start: 2019-01-02

## 2019-01-02 ASSESSMENT — ENCOUNTER SYMPTOMS
BACK PAIN: 0
CHEST TIGHTNESS: 0
CONSTIPATION: 0
BLOOD IN STOOL: 0
ABDOMINAL PAIN: 1
RECTAL PAIN: 0
WHEEZING: 0
SHORTNESS OF BREATH: 1
VOMITING: 0
TROUBLE SWALLOWING: 0
EYE REDNESS: 0
NAUSEA: 0
ABDOMINAL DISTENTION: 1
ANAL BLEEDING: 0
EYE PAIN: 0
SINUS PAIN: 0
DIARRHEA: 1
SINUS PRESSURE: 0

## 2019-01-07 ENCOUNTER — TELEPHONE (OUTPATIENT)
Dept: GASTROENTEROLOGY | Age: 54
End: 2019-01-07

## 2019-02-22 ENCOUNTER — TELEPHONE (OUTPATIENT)
Dept: GASTROENTEROLOGY | Age: 54
End: 2019-02-22

## 2019-02-26 ENCOUNTER — HOSPITAL ENCOUNTER (OUTPATIENT)
Age: 54
Setting detail: SPECIMEN
Discharge: HOME | End: 2019-02-26
Payer: COMMERCIAL

## 2019-02-26 DIAGNOSIS — R17 JAUNDICE: ICD-10-CM

## 2019-02-26 LAB
ABSOLUTE EOS #: 0.21 K/UL (ref 0–0.44)
ABSOLUTE IMMATURE GRANULOCYTE: <0.03 K/UL (ref 0–0.3)
ABSOLUTE LYMPH #: 2.48 K/UL (ref 1.1–3.7)
ABSOLUTE MONO #: 0.27 K/UL (ref 0.1–1.2)
ALBUMIN SERPL-MCNC: 3.4 G/DL (ref 3.5–5.2)
ALBUMIN/GLOBULIN RATIO: 0.9 (ref 1–2.5)
ALP BLD-CCNC: 100 U/L (ref 35–104)
ALT SERPL-CCNC: 51 U/L (ref 5–33)
ANION GAP SERPL CALCULATED.3IONS-SCNC: 13 MMOL/L (ref 9–17)
AST SERPL-CCNC: 134 U/L
BASOPHILS # BLD: 1 % (ref 0–2)
BASOPHILS ABSOLUTE: 0.04 K/UL (ref 0–0.2)
BILIRUB SERPL-MCNC: 1.1 MG/DL (ref 0.3–1.2)
BILIRUBIN DIRECT: 0.6 MG/DL
BILIRUBIN, INDIRECT: 0.5 MG/DL (ref 0–1)
BUN BLDV-MCNC: 7 MG/DL (ref 6–20)
BUN/CREAT BLD: ABNORMAL (ref 9–20)
CALCIUM SERPL-MCNC: 8.7 MG/DL (ref 8.6–10.4)
CERULOPLASMIN: 28 MG/DL (ref 16–45)
CHLORIDE BLD-SCNC: 108 MMOL/L (ref 98–107)
CO2: 25 MMOL/L (ref 20–31)
CREAT SERPL-MCNC: 0.47 MG/DL (ref 0.5–0.9)
DIFFERENTIAL TYPE: ABNORMAL
EOSINOPHILS RELATIVE PERCENT: 4 % (ref 1–4)
GFR AFRICAN AMERICAN: >60 ML/MIN
GFR NON-AFRICAN AMERICAN: >60 ML/MIN
GFR SERPL CREATININE-BSD FRML MDRD: ABNORMAL ML/MIN/{1.73_M2}
GFR SERPL CREATININE-BSD FRML MDRD: ABNORMAL ML/MIN/{1.73_M2}
GLOBULIN: ABNORMAL G/DL (ref 1.5–3.8)
GLUCOSE BLD-MCNC: 92 MG/DL (ref 70–99)
HAV IGM SER IA-ACNC: NONREACTIVE
HCT VFR BLD CALC: 40.4 % (ref 36.3–47.1)
HEMOGLOBIN: 12.8 G/DL (ref 11.9–15.1)
HEPATITIS B CORE IGM ANTIBODY: NONREACTIVE
HEPATITIS B SURFACE ANTIGEN: NONREACTIVE
HEPATITIS C ANTIBODY: NONREACTIVE
IMMATURE GRANULOCYTES: 0 %
LYMPHOCYTES # BLD: 50 % (ref 24–43)
MCH RBC QN AUTO: 30.8 PG (ref 25.2–33.5)
MCHC RBC AUTO-ENTMCNC: 31.7 G/DL (ref 28.4–34.8)
MCV RBC AUTO: 97.3 FL (ref 82.6–102.9)
MONOCYTES # BLD: 6 % (ref 3–12)
NRBC AUTOMATED: 0 PER 100 WBC
PDW BLD-RTO: 12.8 % (ref 11.8–14.4)
PLATELET # BLD: 142 K/UL (ref 138–453)
PLATELET ESTIMATE: ABNORMAL
PMV BLD AUTO: 10.8 FL (ref 8.1–13.5)
POTASSIUM SERPL-SCNC: 4.4 MMOL/L (ref 3.7–5.3)
RBC # BLD: 4.15 M/UL (ref 3.95–5.11)
RBC # BLD: ABNORMAL 10*6/UL
SEG NEUTROPHILS: 39 % (ref 36–65)
SEGMENTED NEUTROPHILS ABSOLUTE COUNT: 1.9 K/UL (ref 1.5–8.1)
SODIUM BLD-SCNC: 146 MMOL/L (ref 135–144)
TOTAL PROTEIN: 7 G/DL (ref 6.4–8.3)
WBC # BLD: 4.9 K/UL (ref 3.5–11.3)
WBC # BLD: ABNORMAL 10*3/UL

## 2019-02-27 LAB — ANTI-NUCLEAR ANTIBODY (ANA): NEGATIVE

## 2019-02-28 LAB
HERPES SIMPLEX VIRUS 1 IGG: 6.46
HERPES SIMPLEX VIRUS 2 IGG: 5.14

## 2019-03-01 LAB
ALPHA-1 ANTITRYPSIN PHENOTYPE: NORMAL
ALPHA-1 ANTITRYPSIN: 166 MG/DL (ref 90–200)
CMV SOURCE: NORMAL
CYTOMEGALOVIRUS QUANT. PCR: NOT DETECTED
MITOCHONDRIAL ANTIBODY: 4.2 UNITS (ref 0–20)
SMOOTH MUSCLE ANTIBODY: NORMAL

## 2019-03-04 LAB
EBV EARLY ANTIGEN AB, IGG: 37.6 U/ML (ref 0–10.9)
EBV NUCLEAR AG AB: 254 U/ML (ref 0–21.9)
EPSTEIN-BARR VCA IGG: 293 U/ML (ref 0–21.9)
EPSTEIN-BARR VCA IGM: 45.7 U/ML (ref 0–43.9)

## 2019-03-13 ENCOUNTER — OFFICE VISIT (OUTPATIENT)
Dept: GASTROENTEROLOGY | Age: 54
End: 2019-03-13
Payer: COMMERCIAL

## 2019-03-13 VITALS — WEIGHT: 163 LBS | HEIGHT: 62 IN | BODY MASS INDEX: 30 KG/M2

## 2019-03-13 DIAGNOSIS — R17 JAUNDICE: Primary | ICD-10-CM

## 2019-03-13 PROCEDURE — 99213 OFFICE O/P EST LOW 20 MIN: CPT | Performed by: INTERNAL MEDICINE

## 2019-03-13 PROCEDURE — 3017F COLORECTAL CA SCREEN DOC REV: CPT | Performed by: INTERNAL MEDICINE

## 2019-03-13 PROCEDURE — G8484 FLU IMMUNIZE NO ADMIN: HCPCS | Performed by: INTERNAL MEDICINE

## 2019-03-13 PROCEDURE — 1036F TOBACCO NON-USER: CPT | Performed by: INTERNAL MEDICINE

## 2019-03-13 PROCEDURE — G8417 CALC BMI ABV UP PARAM F/U: HCPCS | Performed by: INTERNAL MEDICINE

## 2019-03-13 PROCEDURE — G8427 DOCREV CUR MEDS BY ELIG CLIN: HCPCS | Performed by: INTERNAL MEDICINE

## 2019-03-13 ASSESSMENT — ENCOUNTER SYMPTOMS
ABDOMINAL PAIN: 1
SINUS PRESSURE: 0
EYE PAIN: 0
TROUBLE SWALLOWING: 0
DIARRHEA: 0
BACK PAIN: 0
WHEEZING: 0
SHORTNESS OF BREATH: 0
VOMITING: 0
ABDOMINAL DISTENTION: 1
ANAL BLEEDING: 0
CONSTIPATION: 0
NAUSEA: 0
EYE REDNESS: 0
SINUS PAIN: 0
BLOOD IN STOOL: 0
RECTAL PAIN: 0
CHEST TIGHTNESS: 0

## 2019-09-06 ENCOUNTER — TELEPHONE (OUTPATIENT)
Dept: GASTROENTEROLOGY | Age: 54
End: 2019-09-06

## 2019-09-06 NOTE — TELEPHONE ENCOUNTER
Writer contacted the patient and left voicemail reminding patient to please complete her labs before her appointment on Wednesday and that if she is unable to complete them to please contact the office to R/S her appt.

## 2019-09-10 ENCOUNTER — TELEPHONE (OUTPATIENT)
Dept: GASTROENTEROLOGY | Age: 54
End: 2019-09-10

## 2019-09-24 ENCOUNTER — TELEPHONE (OUTPATIENT)
Dept: GASTROENTEROLOGY | Age: 54
End: 2019-09-24

## (undated) DEVICE — DEFENDO AIR WATER SUCTION AND BIOPSY VALVE KIT FOR  OLYMPUS: Brand: DEFENDO AIR/WATER/SUCTION AND BIOPSY VALVE

## (undated) DEVICE — AIRLIFE™ NASAL OXYGEN CANNULA CURVED, FLARED TIP, WITH 7 FEET (2.1 M) CRUSH RESISTANT TUBING, OVER-THE-EAR STYLE: Brand: AIRLIFE™

## (undated) DEVICE — JELLY,LUBE,STERILE,FLIP TOP,TUBE,2-OZ: Brand: MEDLINE